# Patient Record
Sex: MALE | Race: WHITE | Employment: FULL TIME | URBAN - METROPOLITAN AREA
[De-identification: names, ages, dates, MRNs, and addresses within clinical notes are randomized per-mention and may not be internally consistent; named-entity substitution may affect disease eponyms.]

---

## 2017-03-24 ENCOUNTER — TELEPHONE (OUTPATIENT)
Dept: FAMILY MEDICINE | Facility: CLINIC | Age: 52
End: 2017-03-24

## 2017-03-24 DIAGNOSIS — M51.369 DDD (DEGENERATIVE DISC DISEASE), LUMBAR: ICD-10-CM

## 2017-03-27 RX ORDER — OXYCODONE AND ACETAMINOPHEN 5; 325 MG/1; MG/1
1-2 TABLET ORAL EVERY 4 HOURS PRN
Qty: 40 TABLET | Refills: 0 | Status: SHIPPED | OUTPATIENT
Start: 2017-03-27 | End: 2017-05-01

## 2017-03-27 NOTE — TELEPHONE ENCOUNTER
Patient informed of prescription being filled for his percocet. Informed of need for a follow up appointment with Dr. Fuentes since he has not seen him in over 2 years. Patient scheduled for May 1st, informed to also come fasting to his appointment incase provider would want to have labs done. Script brought to [facility]. Yohana Berg LPN

## 2017-03-27 NOTE — TELEPHONE ENCOUNTER
Although patient infrequently asks for these, it will be almost 2 years since I last saw him in the office. Please request that he make an appointment for follow-up before the end of June. Donaldo Fuentes M.D.

## 2017-05-01 ENCOUNTER — OFFICE VISIT (OUTPATIENT)
Dept: FAMILY MEDICINE | Facility: CLINIC | Age: 52
End: 2017-05-01
Payer: COMMERCIAL

## 2017-05-01 VITALS
SYSTOLIC BLOOD PRESSURE: 124 MMHG | DIASTOLIC BLOOD PRESSURE: 68 MMHG | HEIGHT: 66 IN | OXYGEN SATURATION: 99 % | TEMPERATURE: 97.1 F | WEIGHT: 171 LBS | BODY MASS INDEX: 27.48 KG/M2 | HEART RATE: 72 BPM

## 2017-05-01 DIAGNOSIS — R05.9 COUGH: ICD-10-CM

## 2017-05-01 DIAGNOSIS — Z72.0 TOBACCO ABUSE: ICD-10-CM

## 2017-05-01 DIAGNOSIS — M51.379 DEGENERATION OF LUMBAR OR LUMBOSACRAL INTERVERTEBRAL DISC: ICD-10-CM

## 2017-05-01 DIAGNOSIS — J20.9 ACUTE BRONCHITIS WITH SYMPTOMS > 10 DAYS: ICD-10-CM

## 2017-05-01 DIAGNOSIS — M50.30 DDD (DEGENERATIVE DISC DISEASE), CERVICAL: Primary | ICD-10-CM

## 2017-05-01 DIAGNOSIS — M51.369 DDD (DEGENERATIVE DISC DISEASE), LUMBAR: ICD-10-CM

## 2017-05-01 PROCEDURE — 99215 OFFICE O/P EST HI 40 MIN: CPT | Performed by: FAMILY MEDICINE

## 2017-05-01 RX ORDER — OXYCODONE AND ACETAMINOPHEN 5; 325 MG/1; MG/1
1-2 TABLET ORAL EVERY 4 HOURS PRN
Qty: 40 TABLET | Refills: 0 | Status: SHIPPED | OUTPATIENT
Start: 2017-05-01 | End: 2017-05-19

## 2017-05-01 RX ORDER — AZITHROMYCIN 250 MG/1
TABLET, FILM COATED ORAL
Qty: 6 TABLET | Refills: 0 | Status: SHIPPED | OUTPATIENT
Start: 2017-05-01 | End: 2017-05-19

## 2017-05-01 ASSESSMENT — PAIN SCALES - GENERAL: PAINLEVEL: SEVERE PAIN (6)

## 2017-05-01 NOTE — PATIENT INSTRUCTIONS
Take antibiotics with food. If cough does not improve with this, I would consider chest xray  Chantix start as ordered, watch for depression  Have neck mri and after I see results, I will make a plan with you

## 2017-05-01 NOTE — PROGRESS NOTES
SUBJECTIVE:                                                    Eric Gabriel is a 52 year old male who presents to clinic today for the following health issues:        Back Pain Follow Up - c/o back pain for years       Description:   Location of pain:  Low back  Character of pain: sharp  Pain radiation: Does not radiate and radiates below the knee   Since last visit, pain is:  unchanged  New numbness or weakness in legs, not attributed to pain:  no     Intensity: Currently 6/10    History:   Pain interferes with job: sometimes  Therapies tried without relief: Physical Therapy  Therapies tried with relief: Time to rest           C/o Elbow pain and cough    Amount of exercise or physical activity: None    Problems taking medications regularly: No    Medication side effects: c/o constipation    Diet: regular (no restrictions)      In the last year patient's wife . He is grieving for her but does not feel depressed. He has noticed increased back pain and neck pain and these limit him. He takes his pain medication but not daily and certainly not routinely only on the really bad days. He states for him the pain level often is a baseline of 5. It seems more prominent in his neck now. At one time he had an MRI of the neck more than 10 years ago and degenerative problems were seen. We do not have these records. His lumbar spine had the MRI 5 years ago and he was told by neurosurgeon and a chiropractor that surgery is not suggested. Physical therapy would be the treatment. Patient has worked as a  for 25 years and because of the pain more than anything he is considering retiring. 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 He may or may not truly be ready to quit and would reconsider if we could help with neck and back problems. He has gained weight recently and he knows this affects him. He is always managed to deal with pain and be physically active. He started to notice this is harder to do.    In  "the last 2 weeks she has developed headache cough and perhaps fever. The cough persists and is bringing up colored sputum. This is prompting him to think about quitting smoking. He is talked others in Chantix has helped him. He wonders if there is anything else that might be beneficial. He says he is pretty stubborn and she quit drinking he can quit smoking. Part of this reason to quit is when the considers remaining healthy for the rest years of his life, he knows this would be important. It sounds as if his wife had made some decisions which affected her health and he does not want this to happen to him.    Problem list and histories reviewed & adjusted, as indicated.  Additional history: as documented    BP Readings from Last 3 Encounters:   05/01/17 124/68   09/29/15 106/66   06/29/15 120/70    Wt Readings from Last 3 Encounters:   05/01/17 171 lb (77.6 kg)   06/29/15 167 lb (75.8 kg)   07/02/14 172 lb (78 kg)                  Labs reviewed in EPIC    Reviewed and updated as needed this visit by clinical staff       Reviewed and updated as needed this visit by Provider         ROS:  Constitutional, HEENT, cardiovascular, pulmonary, gi and gu systems are negative, except as otherwise noted.    OBJECTIVE:                                                    /68 (BP Location: Right arm, Patient Position: Chair, Cuff Size: Adult Regular)  Pulse 72  Temp 97.1  F (36.2  C) (Temporal)  Ht 5' 6\" (1.676 m)  Wt 171 lb (77.6 kg)  SpO2 99%  BMI 27.6 kg/m2  Body mass index is 27.6 kg/(m^2).  GENERAL: healthy, alert and no distress  EYES: Eyes grossly normal to inspection, PERRL and conjunctivae and sclerae normal  NECK: no adenopathy, no asymmetry, masses, or scars and thyroid normal to palpation  RESP: lungs clear to auscultation - no rales, rhonchi or wheezes  CV: regular rate and rhythm, normal S1 S2, no S3 or S4, no murmur, click or rub, no peripheral edema and peripheral pulses strong  ABDOMEN: soft, " "nontender, no hepatosplenomegaly, no masses and bowel sounds normal  MS: Exams are grossly normal. He has decreased flexion of his lumbar spine but not great inhibition with immediate activity. He has full motion of his cervical spine but tenderness when he goes to the extremes. Some radiation down both arms.  SKIN: no suspicious lesions or rashes  NEURO: Normal strength and tone, sensory exam grossly normal, mentation intact and slightly positive straight leg raising.  PSYCH: mentation appears normal, affect normal/bright    Diagnostic Test Results:  none      ASSESSMENT/PLAN:                                                        Tobacco Cessation:   reports that he has been smoking.  He has a 20.00 pack-year smoking history. He does not have any smokeless tobacco history on file.  Tobacco Cessation Action Plan: Pharmacotherapies : Chantix  Self help information given to patient    BMI:   Estimated body mass index is 27.6 kg/(m^2) as calculated from the following:    Height as of this encounter: 5' 6\" (1.676 m).    Weight as of this encounter: 171 lb (77.6 kg).   Weight management plan: Discussed healthy diet and exercise guidelines and patient will follow up in 6 months in clinic to re-evaluate.      1. DDD (degenerative disc disease), cervical  This seems to be the part of spine bothering him the most. His most recent MRI of the spine is not available to us and was more than 10 years ago by history. We will obtain MRI of cervical spine today. I did forego x-ray of the neck considering previous MRI showed significant changes. We can determine the next step from here. He uses pain medication very sparingly only the very uncomfortable days. This is refilled for him today. He uses scrips and they last a long time.  - MR Cervical Spine w/o Contrast; Future    2. Degeneration of lumbar or lumbosacral intervertebral disc  Continues to have trouble here. He is interested perhaps in intervention but only wanted a single " MRI done at a time. We do have one from 5 years ago that could be considered for his treatment options    3. Tobacco abuse  Very committed to quit smoking. He is interested in Chantix and we discussed the side effects. He would like to give this medication a try before anything. It has worked for brother and others he knows.  - varenicline (CHANTIX STARTING MONTH PAK) 0.5 MG X 11 & 1 MG X 42 tablet; Take 0.5 mg tab daily for 3 days, then 0.5 mg tab twice daily for 4 days, then 1 mg twice daily.  Dispense: 53 tablet; Refill: 0    4. Cough  This is an acute episode which I believe is infectious. Antibiotic is ordered for this. We may need to do a chest x-ray. It is one of the things and this prompted his decision to quit smoking    5. DDD (degenerative disc disease), lumbar  Again used sparingly and effectively when days are bad  - oxyCODONE-acetaminophen (PERCOCET) 5-325 MG per tablet; Take 1-2 tablets by mouth every 4 hours as needed for moderate to severe pain  Dispense: 40 tablet; Refill: 0    6. Acute bronchitis with symptoms > 10 days  See 4 above  - azithromycin (ZITHROMAX) 250 MG tablet; Two tablets first day, then one tablet daily for four days.  Dispense: 6 tablet; Refill: 0    MEDICATIONS:        - Trial of azithromycin and Chantix.       - Continue other medications without change  FUTURE APPOINTMENTS:       - Follow-up visit in 1-2 months and pending results of MRI  Patient Instructions   Take antibiotics with food. If cough does not improve with this, I would consider chest xray  Chantix start as ordered, watch for depression  Have neck mri and after I see results, I will make a plan with you    Time spent with greater than 50% spent in discussion, making the plan, or filling out paperwork with patient for illness/treatments was 40 minutes or more. This is a gentleman who comes in infrequently and is motivated to make changes in his life to feel better today. He had numerous things to tell me in things  he would like us to accomplish today.    Donaldo Bijan Fuentes MD  [facility]

## 2017-05-01 NOTE — MR AVS SNAPSHOT
After Visit Summary   5/1/2017    Eric Gabriel    MRN: [id number]           Patient Information     Date Of Birth          1965        Visit Information        Provider Department      5/1/2017 8:00 AM Donaldo Fuentes MD [facility]        Today's Diagnoses     DDD (degenerative disc disease), cervical    -  1    Degeneration of lumbar or lumbosacral intervertebral disc        Tobacco abuse        Cough        DDD (degenerative disc disease), lumbar        Acute bronchitis with symptoms > 10 days          Care Instructions    Take antibiotics with food. If cough does not improve with this, I would consider chest xray  Chantix start as ordered, watch for depression  Have neck mri and after I see results, I will make a plan with you        Follow-ups after your visit        Your next 10 appointments already scheduled     May 04, 2017  8:45 AM CDT   MR CERVICAL SPINE W/O CONTRAST with PHMR1   [facility] ([facility])    [address] [place] [id number] [phone]           Take your medicines as usual, unless your doctor tells you not to. Bring a list of your current medicines to your exam (including vitamins, minerals and over-the-counter drugs). Also bring the results of similar scans you may have had.  Please remove any body piercings and hair extensions before you arrive.  Follow your doctor s orders. If you do not, we may have to postpone your exam.  You will not have contrast for this exam. You do not need to do anything special to prepare.  The MRI machine uses a strong magnet. Please wear clothes without metal (snaps, zippers). A sweatsuit works well, or we may give you a hospital gown.   **IMPORTANT** THE INSTRUCTIONS BELOW ARE ONLY FOR THOSE PATIENTS WHO HAVE BEEN TOLD THEY WILL RECEIVE SEDATION OR GENERAL ANESTHESIA DURING THEIR MRI PROCEDURE:  IF YOU WILL RECEIVE SEDATION (take medicine to help you relax during your exam):    You must get the medicine from your doctor before you arrive. Bring the medicine to the exam. Do not take it at home.   Arrive one hour early. Bring someone who can take you home after the test. Your medicine will make you sleepy. After the exam, you may not drive, take a bus or take a taxi by yourself.   No eating 8 hours before your exam. You may have clear liquids up until 4 hours before your exam. (Clear liquids include water, clear tea, black coffee and fruit juice without pulp.)  IF YOU WILL RECEIVE ANESTHESIA (be asleep for your exam):   Arrive 1 1/2 hours early. Bring someone who can take you home after the test. You may not drive, take a bus or take a taxi by yourself.   No eating 8 hours before your exam. You may have clear liquids up until 4 hours before your exam. (Clear liquids include water, clear tea, black coffee and fruit juice without pulp.)   You will spend four to five hours in the recovery room.  Please call the Imaging Department at your exam site with any questions.              Future tests that were ordered for you today     Open Future Orders        Priority Expected Expires Ordered    MR Cervical Spine w/o Contrast Routine  5/1/2018 5/1/2017            Who to contact     If you have questions or need follow up information about today's clinic visit or your schedule please contact [facility] directly at [phone].  Normal or non-critical lab and imaging results will be communicated to you by MyChart, letter or phone within 4 business days after the clinic has received the results. If you do not hear from us within 7 days, please contact the clinic through [employer] or phone. If you have a critical or abnormal lab result, we will notify you by phone as soon as possible.  Submit refill requests through [employer] or call your pharmacy and they will forward the refill request to us. Please allow 3 business days for your refill to be completed.          Additional Information  "About Your Visit        [employer] Information     [employer] lets you send messages to your doctor, view your test results, renew your prescriptions, schedule appointments and more. To sign up, go to [email or web address] . Click on \"Log in\" on the left side of the screen, which will take you to the Welcome page. Then click on \"Sign up Now\" on the right side of the page.     You will be asked to enter the access code listed below, as well as some personal information. Please follow the directions to create your username and password.     Your access code is: [email or web address]  Expires: 2017  9:15 AM     Your access code will  in 90 days. If you need help or a new code, please call your [facility] clinic or [phone].        Care EveryWhere ID     This is your Care EveryWhere ID. This could be used by other organizations to access your [facility] medical records  [id number]        Your Vitals Were     Pulse Temperature Height Pulse Oximetry BMI (Body Mass Index)       72 97.1  F (36.2  C) (Temporal) 5' 6\" (1.676 m) 99% 27.6 kg/m2        Blood Pressure from Last 3 Encounters:   17 124/68   09/29/15 106/66   06/29/15 120/70    Weight from Last 3 Encounters:   17 171 lb (77.6 kg)   06/29/15 167 lb (75.8 kg)   14 172 lb (78 kg)                 Today's Medication Changes          These changes are accurate as of: 17  9:15 AM.  If you have any questions, ask your nurse or doctor.               Start taking these medicines.        Dose/Directions    azithromycin 250 MG tablet   Commonly known as:  ZITHROMAX   Used for:  Acute bronchitis with symptoms > 10 days   Started by:  Donaldo Fuentes MD        Two tablets first day, then one tablet daily for four days.   Quantity:  6 tablet   Refills:  0       varenicline 0.5 MG X 11 & 1 MG X 42 tablet   Commonly known as:  CHANTIX STARTING MONTH    Used for:  Tobacco abuse   Started by:  Donaldo Fuentes MD        Take 0.5 mg tab daily for 3 days, " then 0.5 mg tab twice daily for 4 days, then 1 mg twice daily.   Quantity:  53 tablet   Refills:  0            Where to get your medicines      These medications were sent to [facility] [place], [place] - [address]  [address] [place] [address] [address]     Phone:  [phone]     azithromycin 250 MG tablet    varenicline 0.5 MG X 11 & 1 MG X 42 tablet         Some of these will need a paper prescription and others can be bought over the counter.  Ask your nurse if you have questions.     Bring a paper prescription for each of these medications     oxyCODONE-acetaminophen 5-325 MG per tablet                Primary Care Provider Office Phone # Fax #    Donaldo Bijan Fuentes [phone]       [place] [id number] [place] [address]        Thank you!     Thank you for choosing [facility]  for your care. Our goal is always to provide you with excellent care. Hearing back from our patients is one way we can continue to improve our services. Please take a few minutes to complete the written survey that you may receive in the mail after your visit with us. Thank you!             Your Updated Medication List - Protect others around you: Learn how to safely use, store and throw away your medicines at www.disposemymeds.org.          This list is accurate as of: 5/1/17  9:15 AM.  Always use your most recent med list.                   Brand Name Dispense Instructions for use    azithromycin 250 MG tablet    ZITHROMAX    6 tablet    Two tablets first day, then one tablet daily for four days.       oxyCODONE-acetaminophen 5-325 MG per tablet    PERCOCET    40 tablet    Take 1-2 tablets by mouth every 4 hours as needed for moderate to severe pain       varenicline 0.5 MG X 11 & 1 MG X 42 tablet    CHANTIX STARTING MONTH COLLIN    53 tablet    Take 0.5 mg tab daily for 3 days, then 0.5 mg tab twice daily for 4 days, then 1 mg twice daily.

## 2017-05-01 NOTE — NURSING NOTE
"Chief Complaint   Patient presents with     Pain     c/o neck, back and elbow pain pain     Cough       Initial /68 (BP Location: Right arm, Patient Position: Chair, Cuff Size: Adult Regular)  Pulse 72  Temp 97.1  F (36.2  C) (Temporal)  Ht 5' 6\" (1.676 m)  Wt 171 lb (77.6 kg)  SpO2 99%  BMI 27.6 kg/m2 Estimated body mass index is 27.6 kg/(m^2) as calculated from the following:    Height as of this encounter: 5' 6\" (1.676 m).    Weight as of this encounter: 171 lb (77.6 kg).  Medication Reconciliation: complete  "

## 2017-05-04 ENCOUNTER — HOSPITAL ENCOUNTER (OUTPATIENT)
Dept: MRI IMAGING | Facility: CLINIC | Age: 52
Discharge: HOME | End: 2017-05-04
Attending: FAMILY MEDICINE | Admitting: FAMILY MEDICINE
Payer: COMMERCIAL

## 2017-05-04 DIAGNOSIS — M50.30 DDD (DEGENERATIVE DISC DISEASE), CERVICAL: ICD-10-CM

## 2017-05-04 PROCEDURE — 72141 MRI NECK SPINE W/O DYE: CPT

## 2017-05-04 NOTE — PROGRESS NOTES
Please call patient and let patient know results are significant for nerve pinching consistent with his pain. It is my high suggestion he see our surgical team and let them offer treatment plan.  The findings may be too severe to clear symptoms  without surgery and as I told patient, neck surgeries are often successful.  Dr Ham desired.

## 2017-05-10 ENCOUNTER — TELEPHONE (OUTPATIENT)
Dept: FAMILY MEDICINE | Facility: CLINIC | Age: 52
End: 2017-05-10

## 2017-05-10 DIAGNOSIS — M50.30 DDD (DEGENERATIVE DISC DISEASE), CERVICAL: Primary | ICD-10-CM

## 2017-05-10 NOTE — TELEPHONE ENCOUNTER
Radha, please create a referral for patient to see Dr. Ham. Patient is anxious about getting the ball rolling. He would like to be contacted on his cell phone. Thanks!

## 2017-05-10 NOTE — TELEPHONE ENCOUNTER
----- Message from Donaldo Fuentes MD sent at 5/4/2017 11:56 AM CDT -----  Please call patient and let patient know results are significant for nerve pinching consistent with his pain. It is my high suggestion he see our surgical team and let them offer treatment plan.  The findings may be too severe to clear symptoms  without surgery and as I told patient, neck surgeries are often successful.  Dr Ham desired.

## 2017-05-11 NOTE — TELEPHONE ENCOUNTER
Referral placed for Neurosurgery consult. Patient notified of appointment being scheduled on 5/19 at 8:30 am, in [facility]. Yohana Berg LPN

## 2017-05-19 ENCOUNTER — OFFICE VISIT (OUTPATIENT)
Dept: NEUROSURGERY | Facility: CLINIC | Age: 52
End: 2017-05-19
Payer: COMMERCIAL

## 2017-05-19 VITALS — HEIGHT: 66 IN | TEMPERATURE: 97.6 F | BODY MASS INDEX: 28 KG/M2 | WEIGHT: 174.2 LBS

## 2017-05-19 DIAGNOSIS — M54.12 CERVICAL RADICULOPATHY: Primary | ICD-10-CM

## 2017-05-19 PROCEDURE — 99214 OFFICE O/P EST MOD 30 MIN: CPT | Performed by: PHYSICIAN ASSISTANT

## 2017-05-19 ASSESSMENT — PAIN SCALES - GENERAL: PAINLEVEL: MODERATE PAIN (4)

## 2017-05-19 NOTE — PROGRESS NOTES
"Eric Gabriel is a 52 year old male who presents for:  Chief Complaint   Patient presents with     Neurologic Problem     neck pain        Initial Vitals:  Temp 97.6  F (36.4  C) (Temporal)  Ht 1.676 m (5' 6\")  Wt 79 kg (174 lb 3.2 oz)  BMI 28.12 kg/m2 Estimated body mass index is 28.12 kg/(m^2) as calculated from the following:    Height as of this encounter: 1.676 m (5' 6\").    Weight as of this encounter: 79 kg (174 lb 3.2 oz).. Body surface area is 1.92 meters squared. BP completed using cuff size: NA (Not Taken)  Moderate Pain (4)    Do you feel safe in your environment?  Yes  Do you need any refills today? No    Nursing Comments:       Bev Mckoy CMA ([facility])    "

## 2017-05-19 NOTE — MR AVS SNAPSHOT
After Visit Summary   5/19/2017    Eric Gabriel    MRN: [id number]           Patient Information     Date Of Birth          1965        Visit Information        Provider Department      5/19/2017 8:30 AM Peter Raymundo PA-C [facility]        Today's Diagnoses     Cervical radiculopathy    -  1       Follow-ups after your visit        Additional Services     PAIN MANAGEMENT CENTER ([place]) REFERRAL       Your provider has referred you to the [facility].    Reason for Referral: Procedure or injection only - patient will be contacted within 24 hrs to schedule: Epidural Steroid (interlaminar approach): Cervical Translaminar FRANK at C5-6    Please complete the following questions:    What is your diagnosis for the patient's pain? cervicalgia    Do you have any specific questions for the pain specialist? No    Are there any red flags that may impact the assessment or management of the patient? None    **ANY DIAGNOSTIC TESTS THAT ARE NOT IN EPIC SHOULD BE SENT TO THE PAIN CENTER**    Please note the Pre-Op Pain Consult must be scheduled 2-3 weeks prior to the patient's surgery.  Patient's trying to schedule within 2 weeks of surgery may not be accommodated.     Pre-Op Pain Consults are only good for 30 days.    REGARDING OPIOID MEDICATIONS:  We will always address appropriateness of opioid pain medications, but we generally will not automatically take on a prescribing role. When we do take on prescribing of opioids for chronic pain, it is in collaboration with the referring physician for an intermediate period of time (months), with an expectation that the primary physician or provider will assume the prescribing role if medications are effective at stable doses with demonstrated compliance.  Therefore, please do not assume that your prescribing responsibilities end on the day of pain clinic consultation.  Is this agreeable to you? YES    For any questions,  "contact the [facility] at [phone].    Please be aware that coverage of these services is subject to the terms and limitations of your health insurance plan.  Call member services at your health plan with any benefit or coverage questions.      Please bring the following with you to your appointment:    (1) Any X-Rays, CTs or MRIs which have been performed.  Contact the facility where they were done to arrange for  prior to your scheduled appointment.    (2) List of current medications   (3) This referral request   (4) Any documents/labs given to you for this referral                  Follow-up notes from your care team     Return if symptoms worsen or fail to improve.      Who to contact     If you have questions or need follow up information about today's clinic visit or your schedule please contact [facility] directly at [phone].  Normal or non-critical lab and imaging results will be communicated to you by TrustedIDhart, letter or phone within 4 business days after the clinic has received the results. If you do not hear from us within 7 days, please contact the clinic through MyChart or phone. If you have a critical or abnormal lab result, we will notify you by phone as soon as possible.  Submit refill requests through [employer] or call your pharmacy and they will forward the refill request to us. Please allow 3 business days for your refill to be completed.          Additional Information About Your Visit        Kapitall Information     K[employer]all lets you send messages to your doctor, view your test results, renew your prescriptions, schedule appointments and more. To sign up, go to [email or web address] . Click on \"Log in\" on the left side of the screen, which will take you to the Welcome page. Then click on \"Sign up Now\" on the right side of the page.     You will be asked to enter the access code listed below, as well as some personal information. Please follow " "the directions to create your username and password.     Your access code is: [email or web address]  Expires: 2017  9:15 AM     Your access code will  in 90 days. If you need help or a new code, please call your [facility] clinic or [phone].        Care EveryWhere ID     This is your Care EveryWhere ID. This could be used by other organizations to access your [facility] medical records  [id number]        Your Vitals Were     Temperature Height BMI (Body Mass Index)             97.6  F (36.4  C) (Temporal) 1.676 m (5' 6\") 28.12 kg/m2          Blood Pressure from Last 3 Encounters:   17 124/68   09/29/15 106/66   06/29/15 120/70    Weight from Last 3 Encounters:   17 79 kg (174 lb 3.2 oz)   17 77.6 kg (171 lb)   06/29/15 75.8 kg (167 lb)              We Performed the Following     PAIN MANAGEMENT CENTER ([place]) REFERRAL        Primary Care Provider Office Phone # Fax #    Donaldo Bijan Fuentes [phone]       [facility] [address] [place] [address]        Thank you!     Thank you for choosing [facility]  for your care. Our goal is always to provide you with excellent care. Hearing back from our patients is one way we can continue to improve our services. Please take a few minutes to complete the written survey that you may receive in the mail after your visit with us. Thank you!             Your Updated Medication List - Protect others around you: Learn how to safely use, store and throw away your medicines at www.disposemymeds.org.          This list is accurate as of: 17 10:37 AM.  Always use your most recent med list.                   Brand Name Dispense Instructions for use    varenicline 0.5 MG X 11 & 1 MG X 42 tablet    CHANTIX STARTING MONTH COLLIN    53 tablet    Take 0.5 mg tab daily for 3 days, then 0.5 mg tab twice daily for 4 days, then 1 mg twice daily.         "

## 2017-05-19 NOTE — PROGRESS NOTES
Dr. Robin Ham  [facility]  Neurosurgery Clinic Visit      CC: neck pain    Primary care Provider: Donaldo Fuentes      Reason For Visit:   I was asked to consult on the patient for neck pain.      HPI: Eric Gabriel is a 52 year old male who presents for evaluation of his chief complaint of neck pain. He has had symptoms for more than 10 or 12 years of aching neck pain. He does not really describe any radiating arm pain, but does mention that he does have pain that radiates into his shoulders and scapular region. He has not had any recent treatment. He denies any bowel or bladder changes, or any problems with balance or coordination. He does have a new cervical spine MRI for review.    Past Medical History:   Diagnosis Date     Back pain, chronic        Past Medical History reviewed with patient during visit.    Past Surgical History:   Procedure Laterality Date     C NONSPECIFIC PROCEDURE  1990    lt ey surgery     Past Surgical History reviewed with patient during visit.    Current Outpatient Prescriptions   Medication     varenicline (CHANTIX STARTING MONTH PAK) 0.5 MG X 11 & 1 MG X 42 tablet     No current facility-administered medications for this visit.        Allergies   Allergen Reactions     No Known Drug Allergies        Social History     Social History     Marital status:      Spouse name: Nadeen     Number of children: 3     Years of education: 12     Occupational History     superviser      Social History Main Topics     Smoking status: Current Every Day Smoker     Packs/day: 1.00     Years: 20.00     Smokeless tobacco: None     Alcohol use No     Drug use: No     Sexual activity: Yes     Partners: Female     Other Topics Concern     Caffeine Concern No     Sleep Concern No     Stress Concern No     Weight Concern No     Exercise Yes     Seat Belt Yes     Social History Narrative       Family History   Problem Relation Age of Onset     HEART DISEASE Father            "ROS: 10 point ROS neg other than the symptoms noted above in the HPI.    Vital Signs: Temp 97.6  F (36.4  C) (Temporal)  Ht 5' 6\" (1.676 m)  Wt 174 lb 3.2 oz (79 kg)  BMI 28.12 kg/m2    Examination:  Constitutional:  Alert, well nourished, NAD.  HEENT: Normocephalic, atraumatic.   Pulmonary:  Without shortness of breath, normal effort.   Lymph: no lymphadenopathy to low back or LE.   Integumentary: Skin is free of rashes or lesions.   Cardiovascular:  No pitting edema of BLE.      Neurological:  Awake  Alert  Oriented x 3  Speech clear  Cranial nerves II - XII grossly intact  PERRL  EOMI  Face symmetric  Tongue midline  Motor exam   Shoulder Abduction:  Right:  5/5   Left:  5/5  Biceps:                      Right:  5/5   Left:  5/5  Triceps:                     Right:  5/5   Left:  5/5  Wrist Extensors:       Right:  5/5   Left:  5/5  Wrist Flexors:           Right:  5/5   Left:  5/5  Intrinsics:                   Right:  5/5   Left:  5/5  Sensation normal to bilateral upper and lower extremities.    Reflexes are 2+ in the patellar and Achilles. There is no clonus. Downgoing Babinski.    Musculoskeletal:  Gait: Able to stand from a seated position. Normal non-antalgic, non-myelopathic gait.  Able to heel/toe walk without loss of balance  Cervical examination reveals good range of motion.  No tenderness to palpation of the cervical spine or paraspinous muscles bilaterally.      Imaging:   MRI of the cervical spine was reviewed in the office today. It shows disc degeneration at C3 4, as well as at C5 6 and C6 7. There is a bilateral disc herniation at C5 6, causing a severe bilateral foraminal stenosis. There is left paracentral disc bulging at C6 7, causing a moderate foraminal stenosis there.    Assessment/Plan:    Cervicalgia  Cervical radiculopathy  Multilevel cervical disc degeneration  Bilateral disc herniation at C5 6     Eric Gabriel is a 52 year old male. I did have a discussion the patient regarding " his symptoms. He has had symptoms of worsening neck pain over the last 10 or 12 years. His new MRI today does show multiple levels of disc bulging and degeneration, most pronounced at C5 6 and C6 7. He has not had any recent treatment, but has been doing a home exercise program. I think his next option would be to try an epidural steroid injection. He did elect to proceed with this. I placed an order for a C5 6 translaminar epidural injection. We will see him back after the injection to discuss further treatment options, if necessary.        Peter Raymundo PA-C  [facility]  [address] [address]    Tel [phone]  Pager [phone]

## 2017-05-31 DIAGNOSIS — M51.369 DDD (DEGENERATIVE DISC DISEASE), LUMBAR: ICD-10-CM

## 2017-05-31 RX ORDER — OXYCODONE AND ACETAMINOPHEN 5; 325 MG/1; MG/1
1-2 TABLET ORAL EVERY 4 HOURS PRN
Qty: 40 TABLET | Refills: 0 | Status: SHIPPED | OUTPATIENT
Start: 2017-05-31 | End: 2017-06-27

## 2017-05-31 NOTE — TELEPHONE ENCOUNTER
Oxycodone/APAP 5/325mg      Last Written Prescription Date: 5/1/2017  Last Fill Quantity: 40,  # refills: 0   Last Office Visit with [facility], [facility] or [facility] prescribing provider: 5/1/2017                                             Please bring signed prescription to [facility] if approved.    Thank you,  Halima Alcaraz, [facility]

## 2017-06-26 ENCOUNTER — HOSPITAL ENCOUNTER (OUTPATIENT)
Dept: GENERAL RADIOLOGY | Facility: CLINIC | Age: 52
End: 2017-06-26
Attending: ANESTHESIOLOGY
Payer: COMMERCIAL

## 2017-06-26 ENCOUNTER — HOSPITAL ENCOUNTER (OUTPATIENT)
Facility: CLINIC | Age: 52
Discharge: HOME | End: 2017-06-26
Attending: ANESTHESIOLOGY | Admitting: ANESTHESIOLOGY
Payer: COMMERCIAL

## 2017-06-26 ENCOUNTER — SURGERY (OUTPATIENT)
Age: 52
End: 2017-06-26

## 2017-06-26 VITALS
SYSTOLIC BLOOD PRESSURE: 121 MMHG | DIASTOLIC BLOOD PRESSURE: 76 MMHG | HEIGHT: 66 IN | RESPIRATION RATE: 16 BRPM | TEMPERATURE: 98.3 F | BODY MASS INDEX: 27.64 KG/M2 | OXYGEN SATURATION: 97 % | WEIGHT: 172 LBS

## 2017-06-26 DIAGNOSIS — M54.2 NECK PAIN: ICD-10-CM

## 2017-06-26 PROCEDURE — 77003 FLUOROGUIDE FOR SPINE INJECT: CPT | Mod: TC

## 2017-06-26 PROCEDURE — 25000125 ZZHC RX 250: Performed by: ANESTHESIOLOGY

## 2017-06-26 PROCEDURE — 77003 FLUOROGUIDE FOR SPINE INJECT: CPT

## 2017-06-26 PROCEDURE — 62321 NJX INTERLAMINAR CRV/THRC: CPT | Performed by: ANESTHESIOLOGY

## 2017-06-26 PROCEDURE — 25000128 H RX IP 250 OP 636: Performed by: ANESTHESIOLOGY

## 2017-06-26 PROCEDURE — 40000277 ZZH STATISTIC SURG  C-ARM < 5 MIN FLUORO W STILLS (NON-BILLABLE)

## 2017-06-26 RX ORDER — METHYLPREDNISOLONE ACETATE 40 MG/ML
INJECTION, SUSPENSION INTRA-ARTICULAR; INTRALESIONAL; INTRAMUSCULAR; SOFT TISSUE PRN
Status: DISCONTINUED | OUTPATIENT
Start: 2017-06-26 | End: 2017-06-26 | Stop reason: HOSPADM

## 2017-06-26 RX ORDER — IOPAMIDOL 612 MG/ML
INJECTION, SOLUTION INTRAVASCULAR PRN
Status: DISCONTINUED | OUTPATIENT
Start: 2017-06-26 | End: 2017-06-26 | Stop reason: HOSPADM

## 2017-06-26 RX ORDER — LIDOCAINE HYDROCHLORIDE AND EPINEPHRINE 10; 10 MG/ML; UG/ML
INJECTION, SOLUTION INFILTRATION; PERINEURAL PRN
Status: DISCONTINUED | OUTPATIENT
Start: 2017-06-26 | End: 2017-06-26 | Stop reason: HOSPADM

## 2017-06-26 RX ORDER — LIDOCAINE HYDROCHLORIDE 20 MG/ML
INJECTION, SOLUTION INFILTRATION; PERINEURAL PRN
Status: DISCONTINUED | OUTPATIENT
Start: 2017-06-26 | End: 2017-06-26 | Stop reason: HOSPADM

## 2017-06-26 RX ORDER — DEXAMETHASONE SODIUM PHOSPHATE 10 MG/ML
INJECTION INTRAMUSCULAR; INTRAVENOUS PRN
Status: DISCONTINUED | OUTPATIENT
Start: 2017-06-26 | End: 2017-06-26 | Stop reason: HOSPADM

## 2017-06-26 RX ADMIN — LIDOCAINE HYDROCHLORIDE 2 ML: 20 INJECTION, SOLUTION INFILTRATION; PERINEURAL at 12:41

## 2017-06-26 RX ADMIN — METHYLPREDNISOLONE ACETATE 40 MG: 40 INJECTION, SUSPENSION INTRA-ARTICULAR; INTRALESIONAL; INTRAMUSCULAR; SOFT TISSUE at 12:42

## 2017-06-26 RX ADMIN — LIDOCAINE HYDROCHLORIDE 0.1 ML: 10 INJECTION, SOLUTION EPIDURAL; INFILTRATION; INTRACAUDAL; PERINEURAL at 11:56

## 2017-06-26 RX ADMIN — LIDOCAINE HYDROCHLORIDE,EPINEPHRINE BITARTRATE 1 ML: 10; .01 INJECTION, SOLUTION INFILTRATION; PERINEURAL at 12:42

## 2017-06-26 RX ADMIN — DEXAMETHASONE SODIUM PHOSPHATE 10 MG: 10 INJECTION INTRAMUSCULAR; INTRAVENOUS at 12:41

## 2017-06-26 RX ADMIN — IOPAMIDOL 5 ML: 612 INJECTION, SOLUTION INTRAVENOUS at 12:42

## 2017-06-26 NOTE — IP AVS SNAPSHOT
[facility] [place]    [address] [place] [address] [address] [address]    Phone:  [phone]                                       After Visit Summary   6/26/2017    Eric Gabriel    MRN: [id number]           After Visit Summary Signature Page     I have received my discharge instructions, and my questions have been answered. I have discussed any challenges I see with this plan with the nurse or doctor.    ..........................................................................................................................................  Patient/Patient Representative Signature      ..........................................................................................................................................  Patient Representative Print Name and Relationship to Patient    ..................................................               ................................................  Date                                            Time    ..........................................................................................................................................  Reviewed by Signature/Title    ...................................................              ..............................................  Date                                                            Time

## 2017-06-26 NOTE — OP NOTE
Pre procedure Diagnosis: cervical radiculopathy, cervical degenerative disc disease   Post procedure Diagnosis: Same  Procedure performed: cervical interlaminar epidural steroid injection at C6-7, fluoroscopically guided, contrast controlled   Anesthesia: local  Complications: none  Operators: Espinoza June MD     Indications:   Eric Gabriel is a 52 year old male was sent by Peter Raymundo PA-C for cervical epidural steroid injection.  The patient has a history of chronic neck pain with pain radiating to the back of the shoulders bilaterally.  Examination shows mildly decreased cervical range of motion, paraspinal muscle tenderness, and Spurling's was not performed.  He has tried conservative treatment including activity modifications, and medications.    MRI was done on 5/4/17 which showed   FINDINGS:  The spinal cord appears normal. The paraspinal soft tissues  appear normal.  The bone marrow has normal signal intensity.     C2-C3:  Minimal annular disc bulge. Central canal normal. Mild  degenerative change in the facet joints.     C3-C4:  Broad-based central and left central disc herniation causing  mild mass effect on the dural sac. Left-sided posterior osteophyte  extending into the region of the left C3-C4 neural foramen leading to  moderate left foraminal stenosis. Central canal still normal.     C4-C5: Mild annular disc bulge. Central canal and neural foramina  appear patent.     C5-C6: Degeneration of the disc. Loss of disc space height.  Small-moderate sized broad-based disc protrusion extending both to the  right and left of midline with associated posterior osteophytes  causing mild mass effect on the dural sac. Central canal mildly  narrowed. Moderate-severe bilateral foraminal stenosis due to loss of  disc space height and uncinate spurs.     C6-C7: Degeneration of the disc. Loss of disc space height. Left-sided  disc protrusion with an associated osteophyte causing mass effect on  the dural  "sac. This extends into the region of the left C6-C7 neural  foramen. Left-sided uncinate spurs also noted. There is moderate left  foraminal stenosis due to these degenerative changes.     C7-T1: Normal disc, facet joints, spinal canal and neural foramina.       IMPRESSION:    1. Multilevel degenerative change.  2. Most significant right-sided finding appears to be at the C5-C6  level where there is a broad-based disc protrusion extending to the  right and left of midline. There is also moderate-severe foraminal  stenosis due to loss of disc space height and an uncinate spur.    Options/alternatives, benefits and risks were discussed with the patient including but not limited to bleeding, infection, no pain relief, tissue trauma, exposure to radiation, reaction to medications, spinal cord injury, dural puncture, weakness, numbness and headache.  Questions were answered to his satisfaction and he wishes to proceed. Voluntary informed consent was obtained and signed.     Vitals were reviewed: Yes  /76  Temp 98.3  F (36.8  C)  Resp 16  Ht 5' 6\" (1.676 m)  Wt 172 lb (78 kg)  SpO2 97%  BMI 27.76 kg/m2  Allergies were reviewed:  Yes   Medications were reviewed:  Yes   Pre-procedure pain score: 4-5/10    Procedure:  The patient's medical history, medications, and allergies were reviewed and reconciled.  After obtaining signed informed consent, the patient was brought into the procedure suite and was placed in a prone position on the procedure table.   A Pause for the Cause was performed.  Patient was prepped and draped in the usual sterile fashion.     The C6-7 interspace was identified with AP fluoroscopy.  A total of 2 ml of 1% lidocaine was used to anesthetize the skin and subcutaneous tissues for a C6-7 midline interlaminar approach.    A 20 gauge 3.5 inch Touhy needle was advanced utilizing intermittent AP and Lateral fluoroscopy and saline for loss of resistance.  The epidural space was encountered on the " first pass without difficulties.  Aspiration for blood and CSF was negative.  Needle position was verified by injecting 1.5 ml of Isovue 300 utilizing real-time fluoroscopy that showed good needle placement and epidural spread without signs of intravascular or intrathecal uptake.  Isovue wasted:  13.5 ml.    Then, after repeated negative aspiration for blood or CSF, a test dose was performed by injecting 1 ml of Lidocaine 1% with epinephrine into the epidural space which was negative for heart rate or blood pressure changes, tinnitus, metallic taste, lower extremity paresthesias, or other complaints.    Then, after repeated negative aspiration for blood or CSF, a combination of Depomedrol 40 mg, Dexamethasone 5 mg, Lidocaine 2% 1 ml, diluted with 1.5 ml of normal saline for a total injectate volume of 4 ml was injected into the epidural space in a slow and incremental fashion and the needle was restyletted and withdrawn.  All injected medications were preservative free.    The injection site was cleaned and a sterile dressing was applied.    The patient tolerated the procedure well without complications and was taken to the recovery room for continued observation.    Images were saved to PACS.    Post-procedure pain score: 4-5/10  Follow-up includes:   -f/u phone call in one week  -f/u with referring provider    Espinoza June MD  [facility]

## 2017-06-26 NOTE — IP AVS SNAPSHOT
MRN:[id number]                      After Visit Summary   6/26/2017    Eric Gabriel    MRN: [id number]           Thank you!     Thank you for choosing [facility] for your care. Our goal is always to provide you with excellent care. Hearing back from our patients is one way we can continue to improve our services. Please take a few minutes to complete the written survey that you may receive in the mail after you visit with us. Thank you!        Patient Information     Date Of Birth          1965        About your hospital stay     You were admitted on:  June 26, 2017 You last received care in the:  [facility] OR    You were discharged on:  June 26, 2017       Who to Call     For medical emergencies, please call 911.  For non-urgent questions about your medical care, please call your primary care provider or clinic, [phone]  For questions related to your surgery, please call your surgery clinic        Attending Provider     Provider Specialty    Espinoza Morales MD ANESTHESIOLOGY-PAIN MEDICINE       Primary Care Provider Office Phone # Fax #    Donaldo Bijan Fuentes [phone]      After Care Instructions     Discharge Instructions       [facility]   Procedure Discharge Instructions    Today you saw:    Dr. Espinoza Nathan, Dr. Yeimy Adler    You had an:  Epidural steroid injection   sacro-iliac joint injection   facet joint injection  hip injection  piriformis injection     Medications used:  Lidocaine   Bupivacaine   Dexamethasone Depo-medrol  Omnipaque  Ropivicaine   Kenalog   Omniscan   Normal saline        If you have received sedation before, during, or after your procedure, for the next 24 hours you shall NOT:   -Drive  -Operate machinery  -Drink alcohol  -Sign any legal documents  Be cautious when walking. Numbness and/or weakness in the lower extremities may occur up to  6-8 hours after the procedure due to effect of the local anesthetic  Do not drive for 6 hours. The effect of the local anesthetic could slow your reflexes.   You may resume your regular activities after 24 hours  Avoid strenuous activity for the first 24 hours  You may shower, however avoid swimming, tub baths or hot tubs for 24 hours following your procedure  You may have a mild to moderate increase in pain for several days following the injection.  It may take up to 14 days for the steroid medication to start working although you may feel the effect as early as a few days after the procedure.     You may use ice packs for 10-15 minutes, 3 to 4 times a day at the injection site for comfort  Do not use heat to painful areas for 6 to 8 hours. This will give the local anesthetic time to wear off and prevent you from accidentally burning your skin.   You may use anti-inflammatory medications (such as Ibuprofen or Aleve or Advil) or Tylenol for pain control if necessary  If you were fasting, you may resume your normal diet and medications after the procedure  If you have diabetes, check your blood sugar more frequently than usual as your blood sugar may be higher than normal for 10-14 days following a steroid injection. Contact your doctor who manages your diabetes if your blood sugar is higher than usual  If you experience any of the following, call the pain center nursing line during work hours at [phone] or the after hours provider line at [phone]:  -Fever over 100 degree F  -Swelling, bleeding, redness, drainage, warmth at the injection site  -Progressive weakness or numbness in your legs or arms  -Loss of bowel or bladder function  -Unusual headache that is not relieved by Tylenol  -Unusual new onset of pain that is not improving    Phone #s:  Appointment line: [phone];  Nurse line: [phone]                  Pending Results     No orders found from 6/24/2017 to 6/27/2017.            Admission  "Information     Date & Time Provider Department Dept. Phone    2017 Espinoza Morales MD [facility] [phone]      Your Vitals Were     Blood Pressure Temperature Respirations Height Weight Pulse Oximetry    124/80 98.3  F (36.8  C) 16 1.676 m (5' 6\") 78 kg (172 lb) 98%    BMI (Body Mass Index)                   27.76 kg/m2           MyChart Information     The [employer] lets you send messages to your doctor, view your test results, renew your prescriptions, schedule appointments and more. To sign up, go to [email or web address] . Click on \"Log in\" on the left side of the screen, which will take you to the Welcome page. Then click on \"Sign up Now\" on the right side of the page.     You will be asked to enter the access code listed below, as well as some personal information. Please follow the directions to create your username and password.     Your access code is: [email or web address]  Expires: 2017  9:15 AM     Your access code will  in 90 days. If you need help or a new code, please call your [facility] clinic or [phone].        Care EveryWhere ID     This is your Care EveryWhere ID. This could be used by other organizations to access your [facility] medical records  [id number]        Equal Access to Services     [facility] BETTY AH: Hadii nicholas moser hadasho Soaminaali, waaxda luqadaha, qaybta kaalmada angelica, malik santiago. So [facility] [phone].    ATENCIÓN: Si habla español, tiene a cole disposición servicios gratuitos de asistencia lingüística. Lorene al [phone].    We comply with applicable federal civil rights laws and [place] laws. We do not discriminate on the basis of race, color, national origin, age, disability sex, sexual orientation or gender identity.               Review of your medicines      UNREVIEWED medicines. Ask your doctor about these medicines        Dose / Directions    oxyCODONE-acetaminophen 5-325 MG per tablet   Commonly known as:  " PERCOCET   Used for:  DDD (degenerative disc disease), lumbar        Dose:  1-2 tablet   Take 1-2 tablets by mouth every 4 hours as needed for moderate to severe pain   Quantity:  40 tablet   Refills:  0       varenicline 0.5 MG X 11 & 1 MG X 42 tablet   Commonly known as:  CHANTIX STARTING MONTH COLLIN   Used for:  Tobacco abuse        Take 0.5 mg tab daily for 3 days, then 0.5 mg tab twice daily for 4 days, then 1 mg twice daily.   Quantity:  53 tablet   Refills:  0                Protect others around you: Learn how to safely use, store and throw away your medicines at www.disposemymeds.org.             Medication List: This is a list of all your medications and when to take them. Check marks below indicate your daily home schedule. Keep this list as a reference.      Medications           Morning Afternoon Evening Bedtime As Needed    oxyCODONE-acetaminophen 5-325 MG per tablet   Commonly known as:  PERCOCET   Take 1-2 tablets by mouth every 4 hours as needed for moderate to severe pain                                varenicline 0.5 MG X 11 & 1 MG X 42 tablet   Commonly known as:  CHANTIX STARTING MONTH COLLIN   Take 0.5 mg tab daily for 3 days, then 0.5 mg tab twice daily for 4 days, then 1 mg twice daily.

## 2017-06-27 ENCOUNTER — APPOINTMENT (OUTPATIENT)
Dept: GENERAL RADIOLOGY | Facility: CLINIC | Age: 52
End: 2017-06-27
Attending: FAMILY MEDICINE
Payer: COMMERCIAL

## 2017-06-27 ENCOUNTER — HOSPITAL ENCOUNTER (EMERGENCY)
Facility: CLINIC | Age: 52
Discharge: HOME | End: 2017-06-27
Attending: FAMILY MEDICINE | Admitting: FAMILY MEDICINE
Payer: COMMERCIAL

## 2017-06-27 VITALS
WEIGHT: 172 LBS | OXYGEN SATURATION: 97 % | BODY MASS INDEX: 27.64 KG/M2 | RESPIRATION RATE: 18 BRPM | HEIGHT: 66 IN | TEMPERATURE: 97.9 F | SYSTOLIC BLOOD PRESSURE: 135 MMHG | HEART RATE: 96 BPM | DIASTOLIC BLOOD PRESSURE: 71 MMHG

## 2017-06-27 DIAGNOSIS — R07.89 ATYPICAL CHEST PAIN: ICD-10-CM

## 2017-06-27 DIAGNOSIS — R91.8 ABNORMAL FINDING ON LUNG IMAGING: ICD-10-CM

## 2017-06-27 DIAGNOSIS — D72.829 ELEVATED WHITE BLOOD CELL COUNT, UNSPECIFIED: ICD-10-CM

## 2017-06-27 DIAGNOSIS — F17.200 TOBACCO DEPENDENCE: ICD-10-CM

## 2017-06-27 DIAGNOSIS — R03.0 ELEVATED BLOOD PRESSURE READING WITHOUT DIAGNOSIS OF HYPERTENSION: ICD-10-CM

## 2017-06-27 DIAGNOSIS — R73.09 ELEVATED GLUCOSE LEVEL: ICD-10-CM

## 2017-06-27 LAB
ALBUMIN SERPL-MCNC: 4 G/DL (ref 3.4–5)
ALP SERPL-CCNC: 67 U/L (ref 40–150)
ALT SERPL W P-5'-P-CCNC: 32 U/L (ref 0–70)
ANION GAP SERPL CALCULATED.3IONS-SCNC: 9 MMOL/L (ref 3–14)
AST SERPL W P-5'-P-CCNC: 17 U/L (ref 0–45)
BASOPHILS # BLD AUTO: 0 10E9/L (ref 0–0.2)
BASOPHILS NFR BLD AUTO: 0 %
BILIRUB SERPL-MCNC: 0.3 MG/DL (ref 0.2–1.3)
BUN SERPL-MCNC: 21 MG/DL (ref 7–30)
CALCIUM SERPL-MCNC: 8.9 MG/DL (ref 8.5–10.1)
CHLORIDE SERPL-SCNC: 106 MMOL/L (ref 94–109)
CO2 SERPL-SCNC: 25 MMOL/L (ref 20–32)
CREAT SERPL-MCNC: 0.79 MG/DL (ref 0.66–1.25)
DIFFERENTIAL METHOD BLD: ABNORMAL
EOSINOPHIL # BLD AUTO: 0 10E9/L (ref 0–0.7)
EOSINOPHIL NFR BLD AUTO: 0 %
ERYTHROCYTE [DISTWIDTH] IN BLOOD BY AUTOMATED COUNT: 12.9 % (ref 10–15)
GFR SERPL CREATININE-BSD FRML MDRD: ABNORMAL ML/MIN/1.7M2
GLUCOSE SERPL-MCNC: 175 MG/DL (ref 70–99)
HBA1C MFR BLD: 5.3 % (ref 4.3–6)
HCT VFR BLD AUTO: 49.1 % (ref 40–53)
HGB BLD-MCNC: 16.5 G/DL (ref 13.3–17.7)
IMM GRANULOCYTES # BLD: 0 10E9/L (ref 0–0.4)
IMM GRANULOCYTES NFR BLD: 0.2 %
LYMPHOCYTES # BLD AUTO: 1.1 10E9/L (ref 0.8–5.3)
LYMPHOCYTES NFR BLD AUTO: 8.6 %
MCH RBC QN AUTO: 30.7 PG (ref 26.5–33)
MCHC RBC AUTO-ENTMCNC: 33.6 G/DL (ref 31.5–36.5)
MCV RBC AUTO: 91 FL (ref 78–100)
MONOCYTES # BLD AUTO: 0.5 10E9/L (ref 0–1.3)
MONOCYTES NFR BLD AUTO: 4.4 %
NEUTROPHILS # BLD AUTO: 10.6 10E9/L (ref 1.6–8.3)
NEUTROPHILS NFR BLD AUTO: 86.8 %
PLATELET # BLD AUTO: 241 10E9/L (ref 150–450)
POTASSIUM SERPL-SCNC: 4 MMOL/L (ref 3.4–5.3)
PROT SERPL-MCNC: 7.5 G/DL (ref 6.8–8.8)
RBC # BLD AUTO: 5.38 10E12/L (ref 4.4–5.9)
SODIUM SERPL-SCNC: 140 MMOL/L (ref 133–144)
TROPONIN I SERPL-MCNC: NORMAL UG/L (ref 0–0.04)
WBC # BLD AUTO: 12.2 10E9/L (ref 4–11)

## 2017-06-27 PROCEDURE — 93005 ELECTROCARDIOGRAM TRACING: CPT | Performed by: FAMILY MEDICINE

## 2017-06-27 PROCEDURE — 80053 COMPREHEN METABOLIC PANEL: CPT | Performed by: FAMILY MEDICINE

## 2017-06-27 PROCEDURE — 85025 COMPLETE CBC W/AUTO DIFF WBC: CPT | Performed by: FAMILY MEDICINE

## 2017-06-27 PROCEDURE — 93010 ELECTROCARDIOGRAM REPORT: CPT | Mod: Z6 | Performed by: FAMILY MEDICINE

## 2017-06-27 PROCEDURE — 71020 XR CHEST 2 VW: CPT | Mod: TC

## 2017-06-27 PROCEDURE — 99285 EMERGENCY DEPT VISIT HI MDM: CPT | Mod: 25 | Performed by: FAMILY MEDICINE

## 2017-06-27 PROCEDURE — 83036 HEMOGLOBIN GLYCOSYLATED A1C: CPT | Performed by: FAMILY MEDICINE

## 2017-06-27 PROCEDURE — 84484 ASSAY OF TROPONIN QUANT: CPT | Performed by: FAMILY MEDICINE

## 2017-06-27 RX ORDER — OMEPRAZOLE 40 MG/1
40 CAPSULE, DELAYED RELEASE ORAL DAILY
Qty: 30 CAPSULE | Refills: 0 | Status: SHIPPED | OUTPATIENT
Start: 2017-06-27 | End: 2017-06-30

## 2017-06-27 RX ORDER — LIDOCAINE 40 MG/G
CREAM TOPICAL
Status: DISCONTINUED | OUTPATIENT
Start: 2017-06-27 | End: 2017-06-27 | Stop reason: HOSPADM

## 2017-06-27 RX ORDER — OMEPRAZOLE 40 MG/1
40 CAPSULE, DELAYED RELEASE ORAL DAILY
Qty: 30 CAPSULE | Refills: 0 | Status: SHIPPED | OUTPATIENT
Start: 2017-06-27 | End: 2017-06-27

## 2017-06-27 NOTE — ED AVS SNAPSHOT
[facility] Emergency Department    [address] [place] [address]    Phone:  [phone]    Fax:  [phone]                                       Eric Gabriel   MRN: [id number]    Department:  [facility] Emergency Department   Date of Visit:  6/27/2017           After Visit Summary Signature Page     I have received my discharge instructions, and my questions have been answered. I have discussed any challenges I see with this plan with the nurse or doctor.    ..........................................................................................................................................  Patient/Patient Representative Signature      ..........................................................................................................................................  Patient Representative Print Name and Relationship to Patient    ..................................................               ................................................  Date                                            Time    ..........................................................................................................................................  Reviewed by Signature/Title    ...................................................              ..............................................  Date                                                            Time

## 2017-06-27 NOTE — ED NOTES
"MD reports that the GI cocktail is for if the patient has the \"burning sensation\" come back then we are to try this and see if it works  "

## 2017-06-27 NOTE — ED NOTES
"Pt reports central chest \"buring\" that woke him up twice tonight, but has had off and on for past couple of months, did get injection in neck at clinic yesterday as well  "

## 2017-06-27 NOTE — DISCHARGE INSTRUCTIONS
Thank you for giving us the opportunity to see you. The impression is that you have atypical chest pain.    The cause of your pain is uncertain, but may be due to reflux.  Please see the handout below.    Begin a trial of omeprazole 40 mg daily for the next month.    Follow-up with Dr. Fuentes in the next 5 days.  Discuss scheduling an outpatient cardiac stress test.    Please ask Dr. Fuentes to restart the Chantix to help you stop smoking.  Have your blood pressure and fasting blood sugar checked in the clinic.    After discharge, please closely monitor for any new or worsening symptoms. Return to the Emergency Department at any time if your symptoms worsen.         *CHEST PAIN, UNCERTAIN CAUSE    Based on your exam today, the exact cause of your chest pain is not certain. Your condition does not seem serious at this time, and your pain does not appear to be coming from your heart. However, sometimes the signs of a serious problem take more time to appear. Therefore, watch for the warning signs listed below.  HOME CARE:  1. Rest today and avoid strenuous activity.  2. Take any prescribed medicine as directed.  FOLLOW UP with your doctor in 1-3 days.   GET PROMPT MEDICAL ATTENTION if any of the following occur:    A change in the type of pain: if it feels different, becomes more severe, lasts longer, or begins to spread into your shoulder, arm, neck, jaw or back    Shortness of breath or increased pain with breathing    Weakness, dizziness, or fainting    Cough with blood or dark colored sputum (phlegm)    Fever over 101  F (38.3  C)    Swelling, pain or redness in one leg    [id number] [address], [place] [id number]. All rights reserved. This information is not intended as a substitute for professional medical care. Always follow your healthcare professional's instructions.

## 2017-06-27 NOTE — ED PROVIDER NOTES
[facility] ED Provider Note   CC:     Chief Complaint   Patient presents with     Chest Pain     HPI:  Eric Gabriel is a 52 year old male who presented to the emergency department with burning sensation in the center of his chest that woke him up twice last night.  He has had these symptoms on and off for the past 1-2 months.  He continues to smoke cigarettes and his blood pressure was slightly elevated in the emergency department.  Patient states that the burning sensation reaches a maximum intensity of 5/10, and comes on randomly.  It does not seem to be associated with activity, exertion, deep breathing, eating, or anything else that he is able to associate it with.  He states it is not typical like heartburn.  Patient had a epidural injection involving the cervical spine yesterday, and states that he does not think it is related.  He does not have any radiation of the pain into the neck, jaw or arms, nausea, diaphoresis, or dyspnea.  Patient denies diabetes, hypertension, hyperlipidemia, family history of premature heart disease.  He is a smoker who tried to quit with Chantix, but resumed smoking.  He has been working in a managerial position, but also as a  for 25 years.  He states fairly active and has not had any symptoms with exertion.    Problem List:  Patient Active Problem List    Diagnosis     Degeneration of lumbar or lumbosacral intervertebral disc     Tobacco abuse     DDD (degenerative disc disease), cervical     CARDIOVASCULAR SCREENING; LDL GOAL LESS THAN 160       MEDS:   Previous Medications    VARENICLINE (CHANTIX STARTING MONTH PAK) 0.5 MG X 11 & 1 MG X 42 TABLET    Take 0.5 mg tab daily for 3 days, then 0.5 mg tab twice daily for 4 days, then 1 mg twice daily.       ALLERGIES:    Allergies   Allergen Reactions     No Known Drug Allergies        Past medical, surgical, family and social histories, triage and  "nursing notes were all reviewed.    Review of Systems   All other systems were reviewed and are negative    Physical Exam     Vitals were reviewed  Patient Vitals for the past 8 hrs:   BP Temp Temp src Pulse Heart Rate Resp SpO2 Height Weight   06/27/17 0545 (!) 167/99 97.9  F (36.6  C) Oral 87 85 22 99 % 1.676 m (5' 6\") 78 kg (172 lb)     GENERAL APPEARANCE: Alert, slightly anxious, no apparent distress  FACE: normal facies  EYES: Pupils are equal  HENT: normal external exam  NECK: no adenopathy or asymmetry  RESP: normal respiratory effort; clear breath sounds bilaterally  CV: regular rate and rhythm; no significant murmurs, gallops or rubs  ABD: soft, no tenderness; no rebound or guarding; bowel sounds are normal  MS: no gross deformities noted; normal muscle tone.  EXT: No calf tenderness or pitting edema  SKIN: no worrisome rash  NEURO: no facial droop; no focal deficits, speech is normal  PSYCH: normal mood and affect      Available Lab/Imaging Results     Results for orders placed or performed during the hospital encounter of 06/27/17 (from the past 24 hour(s))   CBC with platelets differential   Result Value Ref Range    WBC 12.2 (H) 4.0 - 11.0 10e9/L    RBC Count 5.38 4.4 - 5.9 10e12/L    Hemoglobin 16.5 13.3 - 17.7 g/dL    Hematocrit 49.1 40.0 - 53.0 %    MCV 91 78 - 100 fl    MCH 30.7 26.5 - 33.0 pg    MCHC 33.6 31.5 - 36.5 g/dL    RDW 12.9 10.0 - 15.0 %    Platelet Count 241 150 - 450 10e9/L    Diff Method Automated Method     % Neutrophils 86.8 %    % Lymphocytes 8.6 %    % Monocytes 4.4 %    % Eosinophils 0.0 %    % Basophils 0.0 %    % Immature Granulocytes 0.2 %    Absolute Neutrophil 10.6 (H) 1.6 - 8.3 10e9/L    Absolute Lymphocytes 1.1 0.8 - 5.3 10e9/L    Absolute Monocytes 0.5 0.0 - 1.3 10e9/L    Absolute Eosinophils 0.0 0.0 - 0.7 10e9/L    Absolute Basophils 0.0 0.0 - 0.2 10e9/L    Abs Immature Granulocytes 0.0 0 - 0.4 10e9/L   Comprehensive metabolic panel   Result Value Ref Range    Sodium 140 " 133 - 144 mmol/L    Potassium 4.0 3.4 - 5.3 mmol/L    Chloride 106 94 - 109 mmol/L    Carbon Dioxide 25 20 - 32 mmol/L    Anion Gap 9 3 - 14 mmol/L    Glucose 175 (H) 70 - 99 mg/dL    Urea Nitrogen 21 7 - 30 mg/dL    Creatinine 0.79 0.66 - 1.25 mg/dL    GFR Estimate >90  Non  GFR Calc   >60 mL/min/1.7m2    GFR Estimate If Black >90   GFR Calc   >60 mL/min/1.7m2    Calcium 8.9 8.5 - 10.1 mg/dL    Bilirubin Total 0.3 0.2 - 1.3 mg/dL    Albumin 4.0 3.4 - 5.0 g/dL    Protein Total 7.5 6.8 - 8.8 g/dL    Alkaline Phosphatase 67 40 - 150 U/L    ALT 32 0 - 70 U/L    AST 17 0 - 45 U/L   Troponin I   Result Value Ref Range    Troponin I ES  0.000 - 0.045 ug/L     <0.015  The 99th percentile for upper reference range is 0.045 ug/L.  Troponin values in   the range of 0.045 - 0.120 ug/L may be associated with risks of adverse   clinical events.     Hemoglobin A1c   Result Value Ref Range    Hemoglobin A1C 5.3 4.3 - 6.0 %   XR Chest 2 Views    Narrative    XR CHEST 2 VW  6/27/2017 6:27 AM      HISTORY: Chest burning.     COMPARISON: None.    FINDINGS: The heart size is normal. The lungs are clear. No  pneumothorax or pleural effusion.      Impression    IMPRESSION: No acute abnormality.     EKG reviewed by me: Normal sinus rhythm with heart rate of 88.  Normal NM, QT and QRS intervals.  Normal axis.  No acute ST-T wave changes.  Impression: Normal ECG.    Impression     Final diagnoses:   Atypical chest pain (burning)   Elevated blood pressure reading without diagnosis of hypertension   Elevated glucose level       ED Course & Medical Decision Making   Eric Gabriel is a 52 year old male who presented to the emergency department with burning sensation to the center of the chest without any radiation into the back, neck, jaw or arms.  He did not have any significant shortness of breath, nausea, vomiting or diaphoresis.  Symptoms have been intermittent for the last 1-2 months, but it awakened him  twice last night.  Patient was able to quit smoking briefly while on Chantix, but started back with the smoking.  He states he has had heartburn in the past but this is very different.  His symptoms did not seem to be associated with deep breathing, or eating or activity.  Initial EKG reviewed by me was normal.  His workup reveals slight elevation of white blood count at 12.2 with 86% neutrophils.  Normal hemoglobin and platelet count.  Comprehensive metabolic panel reveals a close level of 175.  Chest x-ray reviewed by me reveals no acute infiltrates.  There is mild hyperinflation consistent with emphysematous changes.  Formal interpretation pending.  He states he had a bowl of cereal around midnight, approximately 6 hours ago.  An A1c level was added, and is 5.3.  Patient was referred back to his primary care provider, Dr. Fuentes for further outpatient management of smoking cessation, high blood pressure, and elevated glucose.  Begin omeprazole 20-40 milligrams daily for one month.      Written after-visit summary and instructions were given at the time of discharge.      New Prescriptions    OMEPRAZOLE (PRILOSEC) 40 MG CAPSULE    Take 1 capsule (40 mg) by mouth daily         This note was completed in part using Dragon voice recognition, and may contain word and grammatical errors.        Demetrius Wilde MD  06/27/17 6473

## 2017-06-28 ENCOUNTER — TELEPHONE (OUTPATIENT)
Dept: FAMILY MEDICINE | Facility: CLINIC | Age: 52
End: 2017-06-28

## 2017-06-28 NOTE — TELEPHONE ENCOUNTER
Reason for Call:  Same Day Appointment, Requested Provider:  Donaldo Fuentes M.D.    PCP: Donaldo Fuentes    Reason for visit: emergency room follow up    Duration of symptoms: DOS: 6-27-17    Have you been treated for this in the past?     Additional comments: patient called asking to be seen by Dr. Fuentes within the next couple weeks for an emergency room follow up. Patient was seen on 6-27-17 at [facility]    Can we leave a detailed message on this number? YES    Phone number patient can be reached at: Cell number on file:    Telephone Information:   Mobile [phone]       Best Time: anytime    Call taken on 6/28/2017 at 10:49 AM by Angelina Alcocer

## 2017-06-30 ENCOUNTER — OFFICE VISIT (OUTPATIENT)
Dept: FAMILY MEDICINE | Facility: CLINIC | Age: 52
End: 2017-06-30
Payer: COMMERCIAL

## 2017-06-30 VITALS
SYSTOLIC BLOOD PRESSURE: 122 MMHG | HEART RATE: 78 BPM | TEMPERATURE: 97.2 F | OXYGEN SATURATION: 100 % | WEIGHT: 175 LBS | DIASTOLIC BLOOD PRESSURE: 66 MMHG | RESPIRATION RATE: 16 BRPM | BODY MASS INDEX: 28.25 KG/M2

## 2017-06-30 DIAGNOSIS — M50.30 DDD (DEGENERATIVE DISC DISEASE), CERVICAL: ICD-10-CM

## 2017-06-30 DIAGNOSIS — M51.379 DEGENERATION OF LUMBAR OR LUMBOSACRAL INTERVERTEBRAL DISC: ICD-10-CM

## 2017-06-30 DIAGNOSIS — Z72.0 TOBACCO ABUSE: ICD-10-CM

## 2017-06-30 DIAGNOSIS — K21.9 GASTROESOPHAGEAL REFLUX DISEASE WITHOUT ESOPHAGITIS: Primary | ICD-10-CM

## 2017-06-30 DIAGNOSIS — R07.89 OTHER CHEST PAIN: ICD-10-CM

## 2017-06-30 PROCEDURE — 99214 OFFICE O/P EST MOD 30 MIN: CPT | Performed by: FAMILY MEDICINE

## 2017-06-30 RX ORDER — OXYCODONE AND ACETAMINOPHEN 5; 325 MG/1; MG/1
1-2 TABLET ORAL EVERY 4 HOURS PRN
Qty: 40 TABLET | Refills: 0 | Status: SHIPPED | OUTPATIENT
Start: 2017-06-30 | End: 2017-08-07

## 2017-06-30 RX ORDER — VARENICLINE TARTRATE 1 MG/1
1 TABLET, FILM COATED ORAL 2 TIMES DAILY
Qty: 56 TABLET | Refills: 2 | Status: SHIPPED | OUTPATIENT
Start: 2017-06-30 | End: 2017-08-07

## 2017-06-30 RX ORDER — OMEPRAZOLE 40 MG/1
40 CAPSULE, DELAYED RELEASE ORAL DAILY
Qty: 30 CAPSULE | Refills: 3 | Status: SHIPPED | OUTPATIENT
Start: 2017-06-30

## 2017-06-30 ASSESSMENT — PAIN SCALES - GENERAL: PAINLEVEL: MODERATE PAIN (4)

## 2017-06-30 NOTE — PROGRESS NOTES
SUBJECTIVE:                                                    Eric Gabriel is a 52 year old male who presents to clinic today for the following health issues:      ED/UC Followup:    Facility:  [facility]  Date of visit: 6/27/17  Reason for visit: chest burning  Current Status: stable       Patient has come to the conclusion that he needs to take care of himself and not everybody else. He did not start the omeprazole because he has not been having heartburn in the chest pressure improved. Patient is aware he should have the stress test to confirm his heart is not involved. He is very motivated to quit smoking at the current time. 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 He understands all smoking can be harming him. His neck and back remains painful and he uses medication for this at times. His neck in particular is uncomfortable.    Problem list and histories reviewed & adjusted, as indicated.  Additional history: as documented    BP Readings from Last 3 Encounters:   06/30/17 122/66   06/27/17 135/71   06/26/17 121/76    Wt Readings from Last 3 Encounters:   06/30/17 175 lb (79.4 kg)   06/27/17 172 lb (78 kg)   06/26/17 172 lb (78 kg)                  Labs reviewed in EPIC    Reviewed and updated as needed this visit by clinical staff       Reviewed and updated as needed this visit by Provider         ROS:  Constitutional, HEENT, cardiovascular, pulmonary, gi and gu systems are negative, except as otherwise noted.  Usual neck and shoulder pain.    OBJECTIVE:     /66 (BP Location: Left arm, Patient Position: Chair, Cuff Size: Adult Regular)  Pulse 78  Temp 97.2  F (36.2  C) (Temporal)  Resp 16  Wt 175 lb (79.4 kg)  SpO2 100%  BMI 28.25 kg/m2  Body mass index is 28.25 kg/(m^2).  GENERAL: healthy, alert and no distress  EYES: Eyes grossly normal to inspection, PERRL and conjunctivae and sclerae normal  NECK: no adenopathy, no  asymmetry, masses, or scars and thyroid normal to palpation  RESP: lungs clear to auscultation - no rales, rhonchi or wheezes  CV: regular rate and rhythm, normal S1 S2, no S3 or S4, no murmur, click or rub, no peripheral edema and peripheral pulses strong  ABDOMEN: soft, nontender, no hepatosplenomegaly, no masses and bowel sounds normal  MS: Always favors low back and today even favors neck and left shoulder area to activity and motion. Structure of these limbs and back are unremarkable  SKIN: no suspicious lesions or rashes  NEURO: Normal strength and tone, sensory exam grossly normal and mentation intact  PSYCH: mentation appears normal, affect flat, fatigued, judgement and insight intact and appearance well groomed    Diagnostic Test Results:  Results for orders placed or performed during the hospital encounter of 06/27/17   XR Chest 2 Views    Narrative    XR CHEST 2 VW  6/27/2017 6:27 AM      HISTORY: Chest burning.     COMPARISON: None.    FINDINGS: The heart size is normal. The lungs are clear. No  pneumothorax or pleural effusion.      Impression    IMPRESSION: No acute abnormality.    CATHLEEN RAMIREZ MD   CBC with platelets differential   Result Value Ref Range    WBC 12.2 (H) 4.0 - 11.0 10e9/L    RBC Count 5.38 4.4 - 5.9 10e12/L    Hemoglobin 16.5 13.3 - 17.7 g/dL    Hematocrit 49.1 40.0 - 53.0 %    MCV 91 78 - 100 fl    MCH 30.7 26.5 - 33.0 pg    MCHC 33.6 31.5 - 36.5 g/dL    RDW 12.9 10.0 - 15.0 %    Platelet Count 241 150 - 450 10e9/L    Diff Method Automated Method     % Neutrophils 86.8 %    % Lymphocytes 8.6 %    % Monocytes 4.4 %    % Eosinophils 0.0 %    % Basophils 0.0 %    % Immature Granulocytes 0.2 %    Absolute Neutrophil 10.6 (H) 1.6 - 8.3 10e9/L    Absolute Lymphocytes 1.1 0.8 - 5.3 10e9/L    Absolute Monocytes 0.5 0.0 - 1.3 10e9/L    Absolute Eosinophils 0.0 0.0 - 0.7 10e9/L    Absolute Basophils 0.0 0.0 - 0.2 10e9/L    Abs Immature Granulocytes 0.0 0 - 0.4 10e9/L   Comprehensive  "metabolic panel   Result Value Ref Range    Sodium 140 133 - 144 mmol/L    Potassium 4.0 3.4 - 5.3 mmol/L    Chloride 106 94 - 109 mmol/L    Carbon Dioxide 25 20 - 32 mmol/L    Anion Gap 9 3 - 14 mmol/L    Glucose 175 (H) 70 - 99 mg/dL    Urea Nitrogen 21 7 - 30 mg/dL    Creatinine 0.79 0.66 - 1.25 mg/dL    GFR Estimate >90  Non  GFR Calc   >60 mL/min/1.7m2    GFR Estimate If Black >90   GFR Calc   >60 mL/min/1.7m2    Calcium 8.9 8.5 - 10.1 mg/dL    Bilirubin Total 0.3 0.2 - 1.3 mg/dL    Albumin 4.0 3.4 - 5.0 g/dL    Protein Total 7.5 6.8 - 8.8 g/dL    Alkaline Phosphatase 67 40 - 150 U/L    ALT 32 0 - 70 U/L    AST 17 0 - 45 U/L   Troponin I   Result Value Ref Range    Troponin I ES  0.000 - 0.045 ug/L     <0.015  The 99th percentile for upper reference range is 0.045 ug/L.  Troponin values in   the range of 0.045 - 0.120 ug/L may be associated with risks of adverse   clinical events.     Hemoglobin A1c   Result Value Ref Range    Hemoglobin A1C 5.3 4.3 - 6.0 %       ASSESSMENT/PLAN:         Tobacco Cessation:   reports that he has been smoking.  He has a 20.00 pack-year smoking history. He has never used smokeless tobacco.  Tobacco Cessation Action Plan: Self help information given to patient    BMI:   Estimated body mass index is 28.25 kg/(m^2) as calculated from the following:    Height as of 6/27/17: 5' 6\" (1.676 m).    Weight as of this encounter: 175 lb (79.4 kg).   Weight management plan: Discussed healthy diet and exercise guidelines and patient will follow up in 1 month in clinic to re-evaluate.      1. Tobacco abuse  We'll start Chantix again. It did help him originally but he thought he could get by without it. Encouraged that he is just practicing it being a nonsmoker not to be discouraged if it is a challenge  - varenicline (CHANTIX STARTING MONTH PAK) 0.5 MG X 11 & 1 MG X 42 tablet; Take 0.5 mg tab daily for 3 days, then 0.5 mg tab twice daily for 4 days, then 1 mg " twice daily.  Dispense: 53 tablet; Refill: 0  - varenicline (CHANTIX) 1 MG tablet; Take 1 tablet (1 mg) by mouth 2 times daily  Dispense: 56 tablet; Refill: 2    2. Gastroesophageal reflux disease without esophagitis  He will start omeprazole to make sure we are covering acid.  - omeprazole (PRILOSEC) 40 MG capsule; Take 1 capsule (40 mg) by mouth daily  Dispense: 30 capsule; Refill: 3    3. Degeneration of lumbar or lumbosacral intervertebral disc  Continue pain medication and hopefully used sparingly  - oxyCODONE-acetaminophen (PERCOCET) 5-325 MG per tablet; Take 1-2 tablets by mouth every 4 hours as needed for moderate to severe pain  Dispense: 40 tablet; Refill: 0    4. DDD (degenerative disc disease), cervical  As above  - oxyCODONE-acetaminophen (PERCOCET) 5-325 MG per tablet; Take 1-2 tablets by mouth every 4 hours as needed for moderate to severe pain  Dispense: 40 tablet; Refill: 0    5. Other chest pain  Stress test  - Exercise Stress Echocardiogram; Future    MEDICATIONS:  Continue current medications without change  Patient Instructions    the omeprazole so we do cover acid suppression  Resume Chantix, you have refills present  Do the stress test        Donaldo Bijan Fuentes MD  [facility]

## 2017-06-30 NOTE — PATIENT INSTRUCTIONS
the omeprazole so we do cover acid suppression  Resume Chantix, you have refills present  Do the stress test

## 2017-06-30 NOTE — NURSING NOTE
"Chief Complaint   Patient presents with     Hospital F/U     Seen in ER 6/27/17       Initial /66 (BP Location: Left arm, Patient Position: Chair, Cuff Size: Adult Regular)  Pulse 78  Temp 97.2  F (36.2  C) (Temporal)  Resp 16  Wt 175 lb (79.4 kg)  SpO2 100%  BMI 28.25 kg/m2 Estimated body mass index is 28.25 kg/(m^2) as calculated from the following:    Height as of 6/27/17: 5' 6\" (1.676 m).    Weight as of this encounter: 175 lb (79.4 kg).  Medication Reconciliation: complete  "

## 2017-06-30 NOTE — MR AVS SNAPSHOT
"              After Visit Summary   6/30/2017    Eric Gabriel    MRN: [id number]           Patient Information     Date Of Birth          1965        Visit Information        Provider Department      6/30/2017 3:45 PM Donaldo Fuentes MD [facility]        Today's Diagnoses     Gastroesophageal reflux disease without esophagitis    -  1    Tobacco abuse        Degeneration of lumbar or lumbosacral intervertebral disc        DDD (degenerative disc disease), cervical        Other chest pain          Care Instructions     the omeprazole so we do cover acid suppression  Resume Chantix, you have refills present  Do the stress test            Follow-ups after your visit        Future tests that were ordered for you today     Open Future Orders        Priority Expected Expires Ordered    Exercise Stress Echocardiogram Routine  6/30/2018 6/30/2017            Who to contact     If you have questions or need follow up information about today's clinic visit or your schedule please contact [facility] directly at [phone].  Normal or non-critical lab and imaging results will be communicated to you by MyChart, letter or phone within 4 business days after the clinic has received the results. If you do not hear from us within 7 days, please contact the clinic through [employer] or phone. If you have a critical or abnormal lab result, we will notify you by phone as soon as possible.  Submit refill requests through [employer] or call your pharmacy and they will forward the refill request to us. Please allow 3 business days for your refill to be completed.          Additional Information About Your Visit        MyChart Information     [employer] lets you send messages to your doctor, view your test results, renew your prescriptions, schedule appointments and more. To sign up, go to [email or web address] . Click on \"Log in\" on the left side of the screen, which will take you to the Welcome " "page. Then click on \"Sign up Now\" on the right side of the page.     You will be asked to enter the access code listed below, as well as some personal information. Please follow the directions to create your username and password.     Your access code is: [email or web address]  Expires: 2017  9:15 AM     Your access code will  in 90 days. If you need help or a new code, please call your [place] clinic or [phone].        Care EveryWhere ID     This is your Care EveryWhere ID. This could be used by other organizations to access your [place] medical records  [id number]        Your Vitals Were     Pulse Temperature Respirations Pulse Oximetry BMI (Body Mass Index)       78 97.2  F (36.2  C) (Temporal) 16 100% 28.25 kg/m2        Blood Pressure from Last 3 Encounters:   17 122/66   17 135/71   17 121/76    Weight from Last 3 Encounters:   17 175 lb (79.4 kg)   17 172 lb (78 kg)   17 172 lb (78 kg)                 Today's Medication Changes          These changes are accurate as of: 17  5:01 PM.  If you have any questions, ask your nurse or doctor.               Start taking these medicines.        Dose/Directions    oxyCODONE-acetaminophen 5-325 MG per tablet   Commonly known as:  PERCOCET   Used for:  Degeneration of lumbar or lumbosacral intervertebral disc, DDD (degenerative disc disease), cervical   Started by:  Donaldo Fuentes MD        Dose:  1-2 tablet   Take 1-2 tablets by mouth every 4 hours as needed for moderate to severe pain   Quantity:  40 tablet   Refills:  0         These medicines have changed or have updated prescriptions.        Dose/Directions    * varenicline 0.5 MG X 11 & 1 MG X 42 tablet   Commonly known as:  CHANTIX STARTING MONTH    This may have changed:  Another medication with the same name was added. Make sure you understand how and when to take each.   Used for:  Tobacco abuse   Changed by:  Donaldo Fuentes MD        Take 0.5 mg " tab daily for 3 days, then 0.5 mg tab twice daily for 4 days, then 1 mg twice daily.   Quantity:  53 tablet   Refills:  0       * varenicline 1 MG tablet   Commonly known as:  CHANTIX   This may have changed:  You were already taking a medication with the same name, and this prescription was added. Make sure you understand how and when to take each.   Used for:  Tobacco abuse   Changed by:  Donaldo Fuentes MD        Dose:  1 mg   Take 1 tablet (1 mg) by mouth 2 times daily   Quantity:  56 tablet   Refills:  2       * Notice:  This list has 2 medication(s) that are the same as other medications prescribed for you. Read the directions carefully, and ask your doctor or other care provider to review them with you.         Where to get your medicines      These medications were sent to [facility], [place] - [address]   [address] , [facility] [address]     Phone:  [phone]     omeprazole 40 MG capsule    varenicline 0.5 MG X 11 & 1 MG X 42 tablet    varenicline 1 MG tablet         Some of these will need a paper prescription and others can be bought over the counter.  Ask your nurse if you have questions.     Bring a paper prescription for each of these medications     oxyCODONE-acetaminophen 5-325 MG per tablet                Primary Care Provider Office Phone # Fax #    Donaldo Fuentes [phone]       [place] [id number] [place] [address]        Equal Access to Services     [facility]: Hadii nicholas moser hadasho Soomaali, waaxda luqadaha, qaybta kaalmada adeegyada, malik sebastian haypatricia olivo . So [facility] [phone].    ATENCIÓN: Si habla español, tiene a cole disposición servicios gratuitos de asistencia lingüística. Llame al [phone].    We comply with applicable federal civil rights laws and [place] laws. We do not discriminate on the basis of race, color, national origin, age, disability sex, sexual orientation  or gender identity.            Thank you!     Thank you for choosing [facility]  for your care. Our goal is always to provide you with excellent care. Hearing back from our patients is one way we can continue to improve our services. Please take a few minutes to complete the written survey that you may receive in the mail after your visit with us. Thank you!             Your Updated Medication List - Protect others around you: Learn how to safely use, store and throw away your medicines at www.disposemymeds.org.          This list is accurate as of: 6/30/17  5:01 PM.  Always use your most recent med list.                   Brand Name Dispense Instructions for use Diagnosis    omeprazole 40 MG capsule    priLOSEC    30 capsule    Take 1 capsule (40 mg) by mouth daily    Gastroesophageal reflux disease without esophagitis       oxyCODONE-acetaminophen 5-325 MG per tablet    PERCOCET    40 tablet    Take 1-2 tablets by mouth every 4 hours as needed for moderate to severe pain    Degeneration of lumbar or lumbosacral intervertebral disc, DDD (degenerative disc disease), cervical       * varenicline 0.5 MG X 11 & 1 MG X 42 tablet    CHANTIX STARTING MONTH COLLIN    53 tablet    Take 0.5 mg tab daily for 3 days, then 0.5 mg tab twice daily for 4 days, then 1 mg twice daily.    Tobacco abuse       * varenicline 1 MG tablet    CHANTIX    56 tablet    Take 1 tablet (1 mg) by mouth 2 times daily    Tobacco abuse       * Notice:  This list has 2 medication(s) that are the same as other medications prescribed for you. Read the directions carefully, and ask your doctor or other care provider to review them with you.

## 2017-07-03 ENCOUNTER — TELEPHONE (OUTPATIENT)
Dept: FAMILY MEDICINE | Facility: CLINIC | Age: 52
End: 2017-07-03

## 2017-07-03 NOTE — TELEPHONE ENCOUNTER
Patient informed of stress echo being scheduled on Monday, July 10th at 10 am. Reminder given to follow the instructions given to him at his appointment on Friday. Yohana Berg LPN

## 2017-07-10 ENCOUNTER — HOSPITAL ENCOUNTER (OUTPATIENT)
Dept: CARDIOLOGY | Facility: CLINIC | Age: 52
Discharge: HOME | End: 2017-07-10
Attending: FAMILY MEDICINE | Admitting: FAMILY MEDICINE
Payer: COMMERCIAL

## 2017-07-10 DIAGNOSIS — R07.89 OTHER CHEST PAIN: ICD-10-CM

## 2017-07-10 PROCEDURE — 93350 STRESS TTE ONLY: CPT | Mod: 26 | Performed by: INTERNAL MEDICINE

## 2017-07-10 PROCEDURE — 93325 DOPPLER ECHO COLOR FLOW MAPG: CPT | Mod: 26 | Performed by: INTERNAL MEDICINE

## 2017-07-10 PROCEDURE — 93017 CV STRESS TEST TRACING ONLY: CPT | Performed by: REHABILITATION PRACTITIONER

## 2017-07-10 PROCEDURE — 93321 DOPPLER ECHO F-UP/LMTD STD: CPT | Mod: 26 | Performed by: INTERNAL MEDICINE

## 2017-07-10 PROCEDURE — 93325 DOPPLER ECHO COLOR FLOW MAPG: CPT | Mod: TC

## 2017-07-10 PROCEDURE — 93018 CV STRESS TEST I&R ONLY: CPT | Performed by: INTERNAL MEDICINE

## 2017-07-10 PROCEDURE — 93016 CV STRESS TEST SUPVJ ONLY: CPT | Performed by: INTERNAL MEDICINE

## 2017-07-17 NOTE — PROGRESS NOTES
Please please call patient and confirmed that he knows the stress echo was negative. Follow-up plan as arranged or if there are questions or concerns please generate a telephone encounter.

## 2017-08-07 ENCOUNTER — OFFICE VISIT (OUTPATIENT)
Dept: FAMILY MEDICINE | Facility: CLINIC | Age: 52
End: 2017-08-07
Payer: COMMERCIAL

## 2017-08-07 VITALS
HEART RATE: 68 BPM | SYSTOLIC BLOOD PRESSURE: 106 MMHG | BODY MASS INDEX: 27.6 KG/M2 | RESPIRATION RATE: 16 BRPM | DIASTOLIC BLOOD PRESSURE: 66 MMHG | OXYGEN SATURATION: 98 % | WEIGHT: 171 LBS | TEMPERATURE: 96.7 F

## 2017-08-07 DIAGNOSIS — M51.379 DEGENERATION OF LUMBAR OR LUMBOSACRAL INTERVERTEBRAL DISC: Primary | ICD-10-CM

## 2017-08-07 DIAGNOSIS — Z72.0 TOBACCO ABUSE: ICD-10-CM

## 2017-08-07 PROCEDURE — 99214 OFFICE O/P EST MOD 30 MIN: CPT | Performed by: FAMILY MEDICINE

## 2017-08-07 RX ORDER — VARENICLINE TARTRATE 1 MG/1
1 TABLET, FILM COATED ORAL 2 TIMES DAILY
Qty: 56 TABLET | Refills: 3 | Status: SHIPPED | OUTPATIENT
Start: 2017-08-07

## 2017-08-07 ASSESSMENT — PAIN SCALES - GENERAL: PAINLEVEL: NO PAIN (0)

## 2017-08-07 NOTE — MR AVS SNAPSHOT
"              After Visit Summary   8/7/2017    Eric Gabriel    MRN: [id number]           Patient Information     Date Of Birth          1965        Visit Information        Provider Department      8/7/2017 8:00 AM Donaldo Fuentes MD [facility]        Today's Diagnoses     Degeneration of lumbar or lumbosacral intervertebral disc    -  1    Tobacco abuse          Care Instructions    Keep using Chantix; officially it is allowed for 3 months. I will use longer if it helps.  Use omeprazole as needed or I could order ranitidine 300 mg to have on hand.  90 days costs about $5 by prescription  We will do MRI lumbar area and I will check who in our system could consider neurotransmitter for pain control.           Follow-ups after your visit        Future tests that were ordered for you today     Open Future Orders        Priority Expected Expires Ordered    MR Lumbar Spine w/o Contrast Routine  8/7/2018 8/7/2017            Who to contact     If you have questions or need follow up information about today's clinic visit or your schedule please contact [facility] directly at [phone].  Normal or non-critical lab and imaging results will be communicated to you by MyChart, letter or phone within 4 business days after the clinic has received the results. If you do not hear from us within 7 days, please contact the clinic through [employer] or phone. If you have a critical or abnormal lab result, we will notify you by phone as soon as possible.  Submit refill requests through [facility] or call your pharmacy and they will forward the refill request to us. Please allow 3 business days for your refill to be completed.          Additional Information About Your Visit        MyChart Information     [facility] lets you send messages to your doctor, view your test results, renew your prescriptions, schedule appointments and more. To sign up, go to [email or web address] . Click on \"Log in\" " "on the left side of the screen, which will take you to the Welcome page. Then click on \"Sign up Now\" on the right side of the page.     You will be asked to enter the access code listed below, as well as some personal information. Please follow the directions to create your username and password.     Your access code is: [email or web address]  Expires: 2017  8:31 AM     Your access code will  in 90 days. If you need help or a new code, please call your [place] clinic or [phone].        Care EveryWhere ID     This is your Care EveryWhere ID. This could be used by other organizations to access your [place] medical records  [id number]        Your Vitals Were     Pulse Temperature Respirations Pulse Oximetry BMI (Body Mass Index)       68 96.7  F (35.9  C) (Tympanic) 16 98% 27.6 kg/m2        Blood Pressure from Last 3 Encounters:   17 106/66   17 122/66   17 135/71    Weight from Last 3 Encounters:   17 171 lb (77.6 kg)   17 175 lb (79.4 kg)   17 172 lb (78 kg)                 Where to get your medicines      These medications were sent to [facility] [address]   [address] [place] [address]     Phone:  [phone]     varenicline 1 MG tablet          Primary Care Provider Office Phone # Fax #    Donaldo Bijan Fuentes [phone]       [address]   [place] [address]        Equal Access to Services     [facility] : Hadii nicholas foster Soailyn, waaxda luqadaha, qaybta kaalmalik figueroa. So [facility] [phone].    ATENCIÓN: Si habla español, tiene a cole disposición servicios gratuitos de asistencia lingüística. Llame al [phone].    We comply with applicable federal civil rights laws and [place] laws. We do not discriminate on the basis of race, color, national origin, age, disability sex, sexual orientation or gender " identity.            Thank you!     Thank you for choosing [facility]  for your care. Our goal is always to provide you with excellent care. Hearing back from our patients is one way we can continue to improve our services. Please take a few minutes to complete the written survey that you may receive in the mail after your visit with us. Thank you!             Your Updated Medication List - Protect others around you: Learn how to safely use, store and throw away your medicines at www.disposemymeds.org.          This list is accurate as of: 8/7/17  8:31 AM.  Always use your most recent med list.                   Brand Name Dispense Instructions for use Diagnosis    omeprazole 40 MG capsule    priLOSEC    30 capsule    Take 1 capsule (40 mg) by mouth daily    Gastroesophageal reflux disease without esophagitis       varenicline 1 MG tablet    CHANTIX    56 tablet    Take 1 tablet (1 mg) by mouth 2 times daily    Tobacco abuse

## 2017-08-07 NOTE — NURSING NOTE
"Chief Complaint   Patient presents with     Chest Pain     follow up       Initial /66 (BP Location: Right arm, Patient Position: Chair, Cuff Size: Adult Regular)  Pulse 68  Temp 96.7  F (35.9  C) (Tympanic)  Resp 16  Wt 171 lb (77.6 kg)  SpO2 98%  BMI 27.6 kg/m2 Estimated body mass index is 27.6 kg/(m^2) as calculated from the following:    Height as of 6/27/17: 5' 6\" (1.676 m).    Weight as of this encounter: 171 lb (77.6 kg).  Medication Reconciliation: complete  "

## 2017-08-07 NOTE — PROGRESS NOTES
SUBJECTIVE:  Patient had success with Chantix and reducing the total number of cigarettes he smoked. He did find it tasted bad and had less of an effect. He appreciates that we talked about practicing to quit smoking and that he is still working on it. He does have no chest pain now. He uses omeprazole intermittently. His breathing seems slightly better. His mood is improved. He does have a feeling of a plugged ear on his left side occasionally with reduced hearing.    Continues to have neck pain and low back pain. The injection may or may not have helped much for the cervical spine. Some days his activity is quite limited from the lumbar issues. Scars he knows he has never been considered for a spinal stimulator. He would be interested in this.    OBJECTIVE:  /66 (BP Location: Right arm, Patient Position: Chair, Cuff Size: Adult Regular)  Pulse 68  Temp 96.7  F (35.9  C) (Tympanic)  Resp 16  Wt 171 lb (77.6 kg)  SpO2 98%  BMI 27.6 kg/m2  Patient appears comfortable today.  TMs left might be slightly dull but otherwise unremarkable. Right is okay  Neck is negative regarding carotids thyroid's nodes  Lungs are clear  Heart is regular  Abdomen negative  Little or no tenderness in the cervical spine or lumbar spine in reasonably full range of motion today. Neurologically he seems intact.    ASSESSMENT:/PLAN:  Atypical chest pain which may have been reflux but well controlled now with reduced tobacco use. He can use intermittent medication area and  With his lumbar spine having moderate limitations and is occasional need for narcotic pain medications, he may be a candidate for injections, actual surgery although this is not a desired result, or perhaps candidate for spinal stimulator. Therefore MRI will be ordered.  Continue Chantix somewhat indefinitely. He is aware the official package insert would indicate 3+ months at most. He is willing if insurance covers and I order, to use longer to help him with the  practice to quit smoking.  To discuss possibilities for back pain and benefits of an MRI after 5 years, to discuss smoking benefits, did discuss mood and chest pain was most of today's visit  Time spent with greater than 50% spent in discussion, making the plan, or filling out paperwork with patient for illness/treatments was 25 minutes or more.

## 2021-07-19 NOTE — ED AVS SNAPSHOT
[facility] Emergency Department    [address] [place] [address]    Phone:  [phone]    Fax:  [phone]                                       Eric Gabriel   MRN: [id number]    Department:  [facility] Emergency Department   Date of Visit:  6/27/2017           Patient Information     Date Of Birth          1965        Your diagnoses for this visit were:     Atypical chest pain (burning)     Elevated blood pressure reading without diagnosis of hypertension     Elevated glucose level        You were seen by Demetrius Wilde MD.      Follow-up Information     Follow up with Donaldo Fuentes MD In 5 days.    Specialty:  Family Practice    Contact information:    [facility]  [address]   [place] [address] [phone]          Discharge Instructions       Thank you for giving us the opportunity to see you. The impression is that you have atypical chest pain.    The cause of your pain is uncertain, but may be due to reflux.  Please see the handout below.    Begin a trial of omeprazole 40 mg daily for the next month.    Follow-up with Dr. Fuentes in the next 5 days.  Discuss scheduling an outpatient cardiac stress test.    Please ask Dr. Fuentes to restart the Chantix to help you stop smoking.  Have your blood pressure and fasting blood sugar checked in the clinic.    After discharge, please closely monitor for any new or worsening symptoms. Return to the Emergency Department at any time if your symptoms worsen.         *CHEST PAIN, UNCERTAIN CAUSE    Based on your exam today, the exact cause of your chest pain is not certain. Your condition does not seem serious at this time, and your pain does not appear to be coming from your heart. However, sometimes the signs of a serious problem take more time to appear. Therefore, watch for the warning signs listed below.  HOME CARE:  1. Rest today and avoid strenuous activity.  2. Take any prescribed medicine as  directed.  FOLLOW UP with your doctor in 1-3 days.   GET PROMPT MEDICAL ATTENTION if any of the following occur:    A change in the type of pain: if it feels different, becomes more severe, lasts longer, or begins to spread into your shoulder, arm, neck, jaw or back    Shortness of breath or increased pain with breathing    Weakness, dizziness, or fainting    Cough with blood or dark colored sputum (phlegm)    Fever over 101  F (38.3  C)    Swelling, pain or redness in one leg    [id number] [place] [address]. All rights reserved. This information is not intended as a substitute for professional medical care. Always follow your healthcare professional's instructions.      24 Hour Appointment Hotline       To make an appointment at any [facility] clinic, call [phone] ([phone]). If you don't have a family doctor or clinic, we will help you find one. [facility] clinics are conveniently located to serve the needs of you and your family.             Review of your medicines      START taking        Dose / Directions Last dose taken    omeprazole 40 MG capsule   Commonly known as:  priLOSEC   Dose:  40 mg   Quantity:  30 capsule        Take 1 capsule (40 mg) by mouth daily   Refills:  0          Our records show that you are taking the medicines listed below. If these are incorrect, please call your family doctor or clinic.        Dose / Directions Last dose taken    varenicline 0.5 MG X 11 & 1 MG X 42 tablet   Commonly known as:  CHANTIX STARTING MONTH PAK   Quantity:  53 tablet        Take 0.5 mg tab daily for 3 days, then 0.5 mg tab twice daily for 4 days, then 1 mg twice daily.   Refills:  0                Prescriptions were sent or printed at these locations (1 Prescription)                   [facility]   [address] [address]    Telephone:  [phone]   Fax:  [phone]   Hours:                  Printed at  "Department/Unit printer (1 of 1)         omeprazole (PRILOSEC) 40 MG capsule                Procedures and tests performed during your visit     CBC with platelets differential    Comprehensive metabolic panel    EKG 12 lead    Hemoglobin A1c    Peripheral IV catheter    Troponin I    XR Chest 2 Views      Orders Needing Specimen Collection     None      Pending Results     Date and Time Order Name Status Description    2017 0551 XR Chest 2 Views Preliminary             Pending Culture Results     No orders found from 2017 to 2017.            Pending Results Instructions     If you had any lab results that were not finalized at the time of your Discharge, you can call the ED Lab Result RN at [phone]. You will be contacted by this team for any positive Lab results or changes in treatment. The nurses are available 7 days a week from 10A to 6:30P.  You can leave a message 24 hours per day and they will return your call.        Thank you for choosing [facility]       Thank you for choosing [facility] for your care. Our goal is always to provide you with excellent care. Hearing back from our patients is one way we can continue to improve our services. Please take a few minutes to complete the written survey that you may receive in the mail after you visit with us. Thank you!        tarpipe Information     tarpipe lets you send messages to your doctor, view your test results, renew your prescriptions, schedule appointments and more. To sign up, go to [email or web address] . Click on \"Log in\" on the left side of the screen, which will take you to the Welcome page. Then click on \"Sign up Now\" on the right side of the page.     You will be asked to enter the access code listed below, as well as some personal information. Please follow the directions to create your username and password.     Your access code is: [email or web address]  Expires: 2017  9:15 AM     Your access code will  in 90 days. If you " need help or a new code, please call your [facility] clinic or [phone].        Care EveryWhere ID     This is your Care EveryWhere ID. This could be used by other organizations to access your [facility] medical records  [id number]        Equal Access to Services     BREA HERNÁNDEZ : Bria Johnston, yesika cortes, rell kaalnicolas dominguez, malik santiago. So [facility] [phone].    ATENCIÓN: Si habla español, tiene a cole disposición servicios gratuitos de asistencia lingüística. Llame al [phone].    We comply with applicable federal civil rights laws and [place] laws. We do not discriminate on the basis of race, color, national origin, age, disability sex, sexual orientation or gender identity.            After Visit Summary       This is your record. Keep this with you and show to your community pharmacist(s) and doctor(s) at your next visit.                   PRE-OP DIAGNOSIS:  Facial nerve paralysis 19-Jul-2021 15:29:48  Chirag Alberts

## (undated) DEVICE — TRAY EPIDURAL 18GA SINGLE SHOT 406092

## (undated) DEVICE — NDL SPINAL 25GA 3.5" QUINCKE 405180

## (undated) DEVICE — DRSG BANDAID 1X3" FABRIC

## (undated) DEVICE — TUBING EXTENSION SET MICROBORE 8.2" LL 7N8310

## (undated) DEVICE — SYR 03ML LL W/O NDL

## (undated) DEVICE — NDL EPIDURAL TUOHY 20GA 3.5" LF DISP 183A12

## (undated) DEVICE — GLOVE PROTEXIS W/NEU-THERA 8.0  2D73TE80